# Patient Record
Sex: FEMALE | Race: WHITE | NOT HISPANIC OR LATINO | Employment: FULL TIME | URBAN - METROPOLITAN AREA
[De-identification: names, ages, dates, MRNs, and addresses within clinical notes are randomized per-mention and may not be internally consistent; named-entity substitution may affect disease eponyms.]

---

## 2023-09-20 LAB
FOLLITROPIN (IU/L) IN SER/PLAS: 2.6 IU/L
THYROTROPIN (MIU/L) IN SER/PLAS BY DETECTION LIMIT <= 0.05 MIU/L: 1.13 MIU/L (ref 0.44–3.98)

## 2023-10-23 PROBLEM — N93.8 DYSFUNCTIONAL UTERINE BLEEDING: Status: ACTIVE | Noted: 2023-10-23

## 2023-10-23 PROBLEM — J03.90 TONSILLITIS: Status: ACTIVE | Noted: 2023-10-23

## 2023-10-23 PROBLEM — N92.1 MENORRHAGIA WITH IRREGULAR CYCLE: Status: ACTIVE | Noted: 2023-10-23

## 2023-10-23 PROBLEM — R29.898 DECREASED ROM OF NECK: Status: ACTIVE | Noted: 2023-10-23

## 2023-10-23 PROBLEM — N94.6 DYSMENORRHEA: Status: ACTIVE | Noted: 2023-10-23

## 2023-10-23 PROBLEM — J45.901 ASTHMA EXACERBATION (HHS-HCC): Status: ACTIVE | Noted: 2023-10-23

## 2023-10-23 PROBLEM — R29.898 WEAKNESS OF EXTREMITY: Status: ACTIVE | Noted: 2023-10-23

## 2023-10-23 PROBLEM — M54.12 CERVICAL RADICULOPATHY, CHRONIC: Status: ACTIVE | Noted: 2023-10-23

## 2023-10-23 RX ORDER — CELECOXIB 200 MG/1
1 CAPSULE ORAL 2 TIMES DAILY
COMMUNITY

## 2023-10-23 RX ORDER — MONTELUKAST SODIUM 10 MG/1
1 TABLET ORAL DAILY
COMMUNITY

## 2023-10-23 RX ORDER — LISINOPRIL AND HYDROCHLOROTHIAZIDE 20; 25 MG/1; MG/1
1 TABLET ORAL DAILY
COMMUNITY

## 2023-10-23 RX ORDER — ALBUTEROL SULFATE 0.83 MG/ML
2.5 SOLUTION RESPIRATORY (INHALATION) 3 TIMES DAILY PRN
COMMUNITY

## 2023-10-23 RX ORDER — HYOSCYAMINE SULFATE 0.125 MG
1 TABLET ORAL
COMMUNITY

## 2023-10-23 RX ORDER — ALBUTEROL SULFATE 90 UG/1
2 AEROSOL, METERED RESPIRATORY (INHALATION) 4 TIMES DAILY PRN
COMMUNITY
Start: 2021-07-27

## 2023-10-23 RX ORDER — BUDESONIDE AND FORMOTEROL FUMARATE DIHYDRATE 160; 4.5 UG/1; UG/1
2 AEROSOL RESPIRATORY (INHALATION) 2 TIMES DAILY
COMMUNITY

## 2023-10-24 NOTE — ASSESSMENT & PLAN NOTE
Heavier menses are noted over time. TSH was normal and FSH indicated no menopause 9/20/2023. Ultrasound 9/22/2023 measures uterus 8.3 x 4.5 x 5.6 cm without fibroids and with homogeneous 5 mm endometrium. Right ovary was not visualized. Left ovary contained a 3.7 cm simple cyst.

## 2023-10-24 NOTE — PROGRESS NOTES
Subjective   Patient ID: Eulalia Oleary is a 48 y.o. female who presents for No chief complaint on file..  She was seen for annual exam on 9/20/2023. At that time she states she skipped menses in April and has had heavier menses overall. Lab testing returned with normal TSH and FSH did not indicate menopause. Ultrasound was performed on 9/22/2023. The uterus measured 8.3 x 4.5 x 5.6 cm and was without any fibroids or abnormality noted. Endometrium measured thin at 5 mm. The right ovary was not visualized and there was no evidence of adnexal cyst or mass. The left ovary contained a simple 3.7 cm cyst. She had been told in the past that she had fibroids and endometriosis.     She does report that her mother had abnormal cells within the uterus and had a hysterectomy. Her maternal aunt had ovarian cancer. We did discuss potential genetic testing at her last visit. Pap and HPV were negative on 1/19/2022.   At last visit we had discussed potential for endometrial biopsy given her family history of abnormal uterine cells. We also discussed genetic testing and treatment options for bleeding including progestin releasing IUD and surgery.     She states her last two menses were very light and lasted 5 days with spotting for all but two days. She has decided she would like to observe the bleeding for now with no biopsy or treatment. If bleeding continues to be an issue with cramps, prolonged or heavy menses, she will call. We would then chose to proceed with biopsy and possibly IUD.           Review of Systems   Constitutional:  Negative for activity change.   HENT:  Negative for congestion.    Respiratory:  Negative for apnea and cough.    Cardiovascular:  Negative for chest pain.   Gastrointestinal:  Negative for constipation and diarrhea.   Genitourinary:  Negative for hematuria and vaginal pain.   Musculoskeletal:  Negative for joint swelling.   Neurological:  Negative for dizziness.   Psychiatric/Behavioral:  Negative  for agitation.        Past Medical History:   Diagnosis Date    Personal history of other diseases of the circulatory system     History of hypertension    Personal history of other diseases of the musculoskeletal system and connective tissue     History of chronic back pain    Personal history of other diseases of the musculoskeletal system and connective tissue     History of herniated intervertebral disc    Personal history of other diseases of the respiratory system     History of asthma      Past Surgical History:   Procedure Laterality Date    OTHER SURGICAL HISTORY  12/12/2019    [place] tooth extraction    OTHER SURGICAL HISTORY  12/12/2019    Laparoscopy    OTHER SURGICAL HISTORY  12/12/2019    Back surgery      Allergies   Allergen Reactions    Azithromycin Unknown    Gabapentin Unknown      Current Outpatient Medications on File Prior to Visit   Medication Sig Dispense Refill    albuterol 2.5 mg /3 mL (0.083 %) nebulizer solution Take 3 mL (2.5 mg) by nebulization 3 times a day as needed for wheezing.      albuterol 90 mcg/actuation inhaler Inhale 2 puffs 4 times a day as needed.      budesonide-formoteroL (Symbicort) 160-4.5 mcg/actuation inhaler Inhale 2 puffs 2 times a day.      celecoxib (CeleBREX) 200 mg capsule Take 1 capsule (200 mg) by mouth 2 times a day.      hyoscyamine (Levsin) 0.125 mg tablet Take 1 tablet (0.125 mg) by mouth. 3-4 TIMES DAILY AS NEEDED.      lisinopriL-hydrochlorothiazide 20-25 mg tablet Take 1 tablet by mouth once daily.      montelukast (Singulair) 10 mg tablet Take 1 tablet (10 mg) by mouth once daily.       No current facility-administered medications on file prior to visit.        Objective   Physical Exam  Constitutional:       Appearance: Normal appearance. She is obese.   Neurological:      Mental Status: She is alert and oriented to person, place, and time.   Psychiatric:         Attention and Perception: Attention normal.         Mood and Affect: Mood and affect  normal.         Speech: Speech normal.         Behavior: Behavior normal. Behavior is cooperative.           Assessment/Plan   Problem List Items Addressed This Visit             ICD-10-CM    Dysmenorrhea N94.6    Menorrhagia with irregular cycle - Primary N92.1     Heavier menses are noted over time. TSH was normal and FSH indicated no menopause 9/20/2023. Ultrasound 9/22/2023 measures uterus 8.3 x 4.5 x 5.6 cm without fibroids and with homogeneous 5 mm endometrium. Right ovary was not visualized. Left ovary contained a 3.7 cm simple cyst.

## 2023-10-25 ENCOUNTER — OFFICE VISIT (OUTPATIENT)
Dept: OBSTETRICS AND GYNECOLOGY | Facility: CLINIC | Age: 48
End: 2023-10-25
Payer: COMMERCIAL

## 2023-10-25 VITALS
BODY MASS INDEX: 49 KG/M2 | SYSTOLIC BLOOD PRESSURE: 122 MMHG | WEIGHT: 287 LBS | DIASTOLIC BLOOD PRESSURE: 90 MMHG | HEIGHT: 64 IN

## 2023-10-25 DIAGNOSIS — N94.6 DYSMENORRHEA: ICD-10-CM

## 2023-10-25 DIAGNOSIS — N92.1 MENORRHAGIA WITH IRREGULAR CYCLE: Primary | ICD-10-CM

## 2023-10-25 PROCEDURE — 1036F TOBACCO NON-USER: CPT | Performed by: OBSTETRICS & GYNECOLOGY

## 2023-10-25 PROCEDURE — 99214 OFFICE O/P EST MOD 30 MIN: CPT | Performed by: OBSTETRICS & GYNECOLOGY

## 2023-10-25 ASSESSMENT — ENCOUNTER SYMPTOMS
DIARRHEA: 0
ACTIVITY CHANGE: 0
CONSTIPATION: 0
DIZZINESS: 0
HEMATURIA: 0
AGITATION: 0
JOINT SWELLING: 0
COUGH: 0
APNEA: 0

## 2024-09-10 ENCOUNTER — HOSPITAL ENCOUNTER (OUTPATIENT)
Dept: RADIOLOGY | Facility: HOSPITAL | Age: 49
Discharge: HOME | End: 2024-09-10
Payer: COMMERCIAL

## 2024-09-10 VITALS — WEIGHT: 290 LBS | BODY MASS INDEX: 49.51 KG/M2 | HEIGHT: 64 IN

## 2024-09-10 DIAGNOSIS — Z12.31 ENCOUNTER FOR SCREENING MAMMOGRAM FOR MALIGNANT NEOPLASM OF BREAST: ICD-10-CM

## 2024-09-10 PROCEDURE — 77067 SCR MAMMO BI INCL CAD: CPT

## 2024-09-10 PROCEDURE — 77063 BREAST TOMOSYNTHESIS BI: CPT

## 2024-10-04 PROBLEM — Z01.419 WELL WOMAN EXAM WITH ROUTINE GYNECOLOGICAL EXAM: Status: ACTIVE | Noted: 2024-10-04

## 2024-10-04 NOTE — PROGRESS NOTES
Subjective   Patient ID: Eulalia Oleary is a 49 y.o. female who presents for Annual Exam.  She presents for annual exam.     One year ago she had temporarily heavy menses. TSH returned in normal range and FSH was not elevated. Her family history was reviewed at that time as well. She reported that her mother had abnormal cells within the uterus and had a hysterectomy. Her maternal aunt had ovarian cancer. Last year we discussed potential for endometrial biopsy given her family history of abnormal uterine cells. We also discussed genetic testing and treatment options for bleeding including progestin releasing IUD and surgery.   Pelvic ultrasound 9/22/2023 showed a normal appearing uterus and a simple 3.7 cm cyst in the left adnexa. At last visit she declined endometrial biopsy and treatment since the next few menses were light in amount.      She states she is still having heavy menses. Menses are overall monthly but she skipped April menses. She is bleeding for about 7 days. She has heavy bleeding for 2-3 days. She will fill a pad every 2-4 hours and can pass some clots. She can have cramping during and in between menses. She is noting bloating surrounding her menses as well.    We reviewed the recommendation to proceed with ultrasound and endometrial biopsy at follow up.  She is very interested in hysterectomy for definitive management with removal of ovaries due to family history of ovarian cancer. She declines genetic testing at this time, preferring to pursue this privately using [employer] or other home kit.   We reviewed her surgical risks and challenges related to her increased BMI and nulliparous status.           Review of Systems   Constitutional:  Negative for activity change.   HENT:  Negative for congestion.    Respiratory:  Negative for apnea and cough.    Cardiovascular:  Negative for chest pain.   Gastrointestinal:  Negative for constipation and diarrhea.   Genitourinary:  Negative for hematuria and  vaginal pain.   Musculoskeletal:  Negative for joint swelling.   Neurological:  Negative for dizziness.   Psychiatric/Behavioral:  Negative for agitation.        Past Medical History:   Diagnosis Date    Personal history of other diseases of the circulatory system     History of hypertension    Personal history of other diseases of the musculoskeletal system and connective tissue     History of chronic back pain    Personal history of other diseases of the musculoskeletal system and connective tissue     History of herniated intervertebral disc    Personal history of other diseases of the respiratory system     History of asthma      Past Surgical History:   Procedure Laterality Date    OTHER SURGICAL HISTORY  12/12/2019    [place] tooth extraction    OTHER SURGICAL HISTORY  12/12/2019    Laparoscopy    OTHER SURGICAL HISTORY  12/12/2019    Back surgery      Allergies   Allergen Reactions    Azithromycin Unknown    Gabapentin Unknown      Current Outpatient Medications on File Prior to Visit   Medication Sig Dispense Refill    albuterol 2.5 mg /3 mL (0.083 %) nebulizer solution Take 3 mL (2.5 mg) by nebulization 3 times a day as needed for wheezing.      albuterol 90 mcg/actuation inhaler Inhale 2 puffs 4 times a day as needed.      budesonide-formoteroL (Symbicort) 160-4.5 mcg/actuation inhaler Inhale 2 puffs 2 times a day.      celecoxib (CeleBREX) 200 mg capsule Take 1 capsule (200 mg) by mouth 2 times a day.      hyoscyamine (Levsin) 0.125 mg tablet Take 1 tablet (0.125 mg) by mouth. 3-4 TIMES DAILY AS NEEDED.      lisinopriL-hydrochlorothiazide 20-25 mg tablet Take 1 tablet by mouth once daily.      montelukast (Singulair) 10 mg tablet Take 1 tablet (10 mg) by mouth once daily.       No current facility-administered medications on file prior to visit.        Objective   Physical Exam  Constitutional:       Appearance: Normal appearance. She is obese.   Neck:      Thyroid: No thyromegaly.   Cardiovascular:       Rate and Rhythm: Normal rate and regular rhythm.      Heart sounds: Normal heart sounds.   Pulmonary:      Effort: Pulmonary effort is normal.      Breath sounds: Normal breath sounds.   Chest:      Chest wall: No mass.   Breasts:     Right: Normal. No inverted nipple, mass, nipple discharge or skin change.      Left: Normal. No inverted nipple, mass, nipple discharge or skin change.   Abdominal:      General: There is no distension.      Palpations: Abdomen is soft. There is no mass.      Tenderness: There is no abdominal tenderness.   Genitourinary:     General: Normal vulva.      Exam position: Lithotomy position.      Labia:         Right: No rash.         Left: No rash.       Urethra: No urethral lesion.      Vagina: Normal. No lesions.      Cervix: No friability or lesion.      Uterus: Normal. Not enlarged and not tender.       Adnexa: Right adnexa normal and left adnexa normal.        Right: No mass or tenderness.          Left: No mass or tenderness.     Musculoskeletal:         General: No deformity.      Cervical back: Neck supple.   Lymphadenopathy:      Cervical: No cervical adenopathy.   Skin:     General: Skin is warm and dry.      Findings: No rash.   Neurological:      General: No focal deficit present.      Mental Status: She is alert.   Psychiatric:         Mood and Affect: Mood normal.         Behavior: Behavior is cooperative.         Thought Content: Thought content normal.           Problem List Items Addressed This Visit       Well woman exam with routine gynecological exam - Primary    Overview     1/9/2022 pap and HPV returned negative.          Current Assessment & Plan     Pap is not yet indicated.  Mammogram is due in September 2025.   Regular exercise and attaining/maintaining a healthy weight is encouraged.   Adequate calcium intake with diet or supplements is encouraged.    We will notify of any abnormal results.          Menorrhagia with irregular cycle    Overview     Heavier menses  are noted over time. TSH was normal and FSH indicated no menopause 9/20/2023. Ultrasound 9/22/2023 measures uterus 8.3 x 4.5 x 5.6 cm without fibroids and with homogeneous 5 mm endometrium. Right ovary was not visualized. Left ovary contained a 3.7 cm simple cyst.  Endometrial biopsy was declined in 2023.         Current Assessment & Plan     Follow up ultrasound is ordered for continued menses.  Endometrial biopsy is recommended and she is willing to perform this at follow up visit after ultrasound.  She is interested in possible surgical management of heavy menses. Will place referral to advanced gynecologic surgeon for surgery planning given anticipated challenges and increased risks due to BMI, abdominal wall anatomy and nulliparous status.          Relevant Orders    US PELVIS TRANSABDOMINAL WITH TRANSVAGINAL    Referral to Gynecology    Dysmenorrhea    Relevant Orders    US PELVIS TRANSABDOMINAL WITH TRANSVAGINAL    Referral to Gynecology     Other Visit Diagnoses       BMI 50.0-59.9, adult (Multi)        Relevant Orders    Referral to Gynecology

## 2024-10-04 NOTE — ASSESSMENT & PLAN NOTE
Pap is not yet indicated.  Mammogram is due in September 2025.   Regular exercise and attaining/maintaining a healthy weight is encouraged.   Adequate calcium intake with diet or supplements is encouraged.    We will notify of any abnormal results.

## 2024-10-08 ENCOUNTER — APPOINTMENT (OUTPATIENT)
Dept: OBSTETRICS AND GYNECOLOGY | Facility: CLINIC | Age: 49
End: 2024-10-08
Payer: COMMERCIAL

## 2024-10-08 VITALS
DIASTOLIC BLOOD PRESSURE: 84 MMHG | BODY MASS INDEX: 50.02 KG/M2 | WEIGHT: 293 LBS | HEIGHT: 64 IN | SYSTOLIC BLOOD PRESSURE: 150 MMHG

## 2024-10-08 DIAGNOSIS — N92.1 MENORRHAGIA WITH IRREGULAR CYCLE: ICD-10-CM

## 2024-10-08 DIAGNOSIS — N94.6 DYSMENORRHEA: ICD-10-CM

## 2024-10-08 DIAGNOSIS — Z01.419 WELL WOMAN EXAM WITH ROUTINE GYNECOLOGICAL EXAM: Primary | ICD-10-CM

## 2024-10-08 PROCEDURE — 99396 PREV VISIT EST AGE 40-64: CPT | Performed by: OBSTETRICS & GYNECOLOGY

## 2024-10-08 PROCEDURE — 1036F TOBACCO NON-USER: CPT | Performed by: OBSTETRICS & GYNECOLOGY

## 2024-10-08 PROCEDURE — 99213 OFFICE O/P EST LOW 20 MIN: CPT | Performed by: OBSTETRICS & GYNECOLOGY

## 2024-10-08 PROCEDURE — 3008F BODY MASS INDEX DOCD: CPT | Performed by: OBSTETRICS & GYNECOLOGY

## 2024-10-08 ASSESSMENT — ENCOUNTER SYMPTOMS
CONSTIPATION: 0
HEMATURIA: 0
APNEA: 0
JOINT SWELLING: 0
DIARRHEA: 0
COUGH: 0
ACTIVITY CHANGE: 0
AGITATION: 0
DIZZINESS: 0

## 2024-10-08 NOTE — ASSESSMENT & PLAN NOTE
Follow up ultrasound is ordered for continued menses.  Endometrial biopsy is recommended and she is willing to perform this at follow up visit after ultrasound.  She is interested in possible surgical management of heavy menses. Will place referral to advanced gynecologic surgeon for surgery planning given anticipated challenges and increased risks due to BMI, abdominal wall anatomy and nulliparous status.

## 2024-10-21 ENCOUNTER — HOSPITAL ENCOUNTER (OUTPATIENT)
Dept: RADIOLOGY | Facility: HOSPITAL | Age: 49
Discharge: HOME | End: 2024-10-21
Payer: COMMERCIAL

## 2024-10-21 DIAGNOSIS — N94.6 DYSMENORRHEA: ICD-10-CM

## 2024-10-21 DIAGNOSIS — N92.1 MENORRHAGIA WITH IRREGULAR CYCLE: ICD-10-CM

## 2024-10-21 PROCEDURE — 76856 US EXAM PELVIC COMPLETE: CPT

## 2024-10-21 PROCEDURE — 76856 US EXAM PELVIC COMPLETE: CPT | Performed by: STUDENT IN AN ORGANIZED HEALTH CARE EDUCATION/TRAINING PROGRAM

## 2024-10-21 PROCEDURE — 76830 TRANSVAGINAL US NON-OB: CPT | Performed by: STUDENT IN AN ORGANIZED HEALTH CARE EDUCATION/TRAINING PROGRAM

## 2024-10-23 PROBLEM — R93.89 THICKENED ENDOMETRIUM: Status: ACTIVE | Noted: 2024-10-23

## 2024-10-23 NOTE — PROGRESS NOTES
Patient ID: Eulalia Oleary is a 49 y.o. female.    Endometrial biopsy    Date/Time: 11/7/2024 9:59 AM    Performed by: Aisha Lao MD  Authorized by: Aisha Lao MD    Consent:     Consent obtained: written    Consent given by: patient    Risks discussed: bleeding, infection and pain    Alternatives discussed: observation  Indications:     Indications: abnormal uterine bleeding    Procedure:     A bimanual exam was performed: yes      Uterus size: 6-8 weeks    Uterus position: midposition    Prepped with: Betadine    Tenaculum used: yes      A local block was performed: no      Local anesthetic: none    Cervix dilated: no      Number of passes: 1  Findings:     Cervix: normal      Specimen collected: low volume sample collected      Patient tolerance: tolerated with difficulty  Subjective   Patient ID: Eulalia Oleary is a 49 y.o. female who presents for Biopsy (EMB for heavy periods.).  10/8/2024 documentation:  She presents for annual exam.     One year ago she had temporarily heavy menses. TSH returned in normal range and FSH was not elevated. Her family history was reviewed at that time as well. She reported that her mother had abnormal cells within the uterus and had a hysterectomy. Her maternal aunt had ovarian cancer. Last year we discussed potential for endometrial biopsy given her family history of abnormal uterine cells. We also discussed genetic testing and treatment options for bleeding including progestin releasing IUD and surgery.   Pelvic ultrasound 9/22/2023 showed a normal appearing uterus and a simple 3.7 cm cyst in the left adnexa. At last visit she declined endometrial biopsy and treatment since the next few menses were light in amount.      She states she is still having heavy menses. Menses are overall monthly but she skipped April menses. She is bleeding for about 7 days. She has heavy bleeding for 2-3 days. She will fill a pad every 2-4 hours and can pass some clots. She  can have cramping during and in between menses. She is noting bloating surrounding her menses as well.    We reviewed the recommendation to proceed with ultrasound and endometrial biopsy at follow up.  She is very interested in hysterectomy for definitive management with removal of ovaries due to family history of ovarian cancer. She declines genetic testing at this time, preferring to pursue this privately using [email or web address] or other home kit.   We reviewed her surgical risks and challenges related to her increased BMI and nulliparous status.     11/7/2024:  Pelvic ultrasound was performed on 10/21/2024:  UTERUS:  The uterus measures  5.9 cm x 3.9 cm x 7.6 cm. Normal myometrium.  ENDOMETRIUM:  Endometrial thickness 1 cm  RIGHT ADNEXA:  Not visualized  LEFT ADNEXA:  The left ovary measures 2.0 cm x 1.9 cm x 3.2 cm and demonstrates normal flow. No gross left adnexal masses are seen, no hydrosalpinx.    Endometrial biopsy is planned today. She is scheduled with Dr. Rodriguez for surgery planning. She also now reveals that she was diagnosed with endometriosis at laparoscopy and was treated with Lupron in her mid 20's.            Review of Systems   Constitutional:  Negative for activity change.   HENT:  Negative for congestion.    Respiratory:  Negative for apnea and cough.    Cardiovascular:  Negative for chest pain.   Gastrointestinal:  Negative for constipation and diarrhea.   Genitourinary:  Negative for hematuria and vaginal pain.   Musculoskeletal:  Negative for joint swelling.   Neurological:  Negative for dizziness.   Psychiatric/Behavioral:  Negative for agitation.        Past Medical History:   Diagnosis Date    Personal history of other diseases of the circulatory system     History of hypertension    Personal history of other diseases of the musculoskeletal system and connective tissue     History of chronic back pain    Personal history of other diseases of the musculoskeletal system and connective tissue      History of herniated intervertebral disc    Personal history of other diseases of the respiratory system     History of asthma      Past Surgical History:   Procedure Laterality Date    OTHER SURGICAL HISTORY  12/12/2019    [place] tooth extraction    OTHER SURGICAL HISTORY  12/12/2019    Laparoscopy    OTHER SURGICAL HISTORY  12/12/2019    Back surgery      Allergies   Allergen Reactions    Azithromycin Unknown    Gabapentin Unknown      Current Outpatient Medications on File Prior to Visit   Medication Sig Dispense Refill    albuterol 2.5 mg /3 mL (0.083 %) nebulizer solution Take 3 mL (2.5 mg) by nebulization 3 times a day as needed for wheezing.      albuterol 90 mcg/actuation inhaler Inhale 2 puffs 4 times a day as needed.      budesonide-formoteroL (Symbicort) 160-4.5 mcg/actuation inhaler Inhale 2 puffs 2 times a day.      celecoxib (CeleBREX) 200 mg capsule Take 1 capsule (200 mg) by mouth 2 times a day.      cholecalciferol (Vitamin D-3) 25 MCG (1000 UT) tablet once every 24 hours.      hyoscyamine (Levsin) 0.125 mg tablet Take 1 tablet (0.125 mg) by mouth. 3-4 TIMES DAILY AS NEEDED.      lansoprazole (Prevacid 24Hr) 15 mg DR capsule Take 1 capsule (15 mg) by mouth once daily.      lisinopriL-hydrochlorothiazide 20-25 mg tablet Take 1 tablet by mouth once daily.      magnesium oxide 500 mg magnesium tablet Take 250 mg by mouth once daily.      montelukast (Singulair) 10 mg tablet Take 1 tablet (10 mg) by mouth once daily.       No current facility-administered medications on file prior to visit.        Objective   Physical Exam  Constitutional:       Appearance: Normal appearance. She is obese.   Abdominal:      Palpations: Abdomen is soft.   Genitourinary:     General: Normal vulva.      Exam position: Lithotomy position.      Labia:         Right: No lesion.         Left: No lesion.       Urethra: No urethral lesion.      Vagina: Normal. No lesions.      Cervix: No friability or lesion.      Uterus:  Normal. Not enlarged and not tender.       Adnexa: Right adnexa normal and left adnexa normal.        Right: No mass or tenderness.          Left: No mass or tenderness.     Skin:     General: Skin is warm and dry.   Neurological:      Mental Status: She is alert.           Problem List Items Addressed This Visit       Thickened endometrium    Overview     10/21/2024 ultrasound shows normal appearing uterus with 1 cm endometrium.   Endometrial biopsy is planned.         Menorrhagia with irregular cycle - Primary    Overview     Heavier menses are noted over time. TSH was normal and FSH indicated no menopause 9/20/2023. Ultrasound 9/22/2023 measures uterus 8.3 x 4.5 x 5.6 cm without fibroids and with homogeneous 5 mm endometrium. Right ovary was not visualized. Left ovary contained a 3.7 cm simple cyst.  Endometrial biopsy was declined in 2023.  10/21/2024 usn shows normal sized uterus with 1 cm endometrial thickness. Endometrial biopsy is performed 11/7/2024.         Current Assessment & Plan     She desires definitive treatment with hysterectomy. She is scheduled in January 2025 with Dr. Rodriguez for surgery planning.          Dysmenorrhea    Overview     Severe cramping with menses.

## 2024-11-07 ENCOUNTER — APPOINTMENT (OUTPATIENT)
Dept: OBSTETRICS AND GYNECOLOGY | Facility: CLINIC | Age: 49
End: 2024-11-07
Payer: COMMERCIAL

## 2024-11-07 VITALS — SYSTOLIC BLOOD PRESSURE: 126 MMHG | DIASTOLIC BLOOD PRESSURE: 88 MMHG | HEIGHT: 64 IN | BODY MASS INDEX: 53.38 KG/M2

## 2024-11-07 DIAGNOSIS — N94.6 DYSMENORRHEA: ICD-10-CM

## 2024-11-07 DIAGNOSIS — N92.1 MENORRHAGIA WITH IRREGULAR CYCLE: Primary | ICD-10-CM

## 2024-11-07 DIAGNOSIS — R93.89 THICKENED ENDOMETRIUM: ICD-10-CM

## 2024-11-07 PROCEDURE — 58100 BIOPSY OF UTERUS LINING: CPT | Performed by: OBSTETRICS & GYNECOLOGY

## 2024-11-07 PROCEDURE — 99213 OFFICE O/P EST LOW 20 MIN: CPT | Performed by: OBSTETRICS & GYNECOLOGY

## 2024-11-07 RX ORDER — CHOLECALCIFEROL (VITAMIN D3) 25 MCG
TABLET ORAL EVERY 24 HOURS
COMMUNITY

## 2024-11-07 RX ORDER — BACLOFEN 20 MG
250 TABLET ORAL
COMMUNITY

## 2024-11-07 RX ORDER — CALC/MAG/B COMPLEX/D3/HERB 61
1 TABLET ORAL DAILY
COMMUNITY

## 2024-11-07 ASSESSMENT — ENCOUNTER SYMPTOMS
HEMATURIA: 0
JOINT SWELLING: 0
AGITATION: 0
APNEA: 0
COUGH: 0
DIARRHEA: 0
ACTIVITY CHANGE: 0
DIZZINESS: 0
CONSTIPATION: 0

## 2024-11-07 NOTE — ASSESSMENT & PLAN NOTE
She desires definitive treatment with hysterectomy. She is scheduled in January 2025 with Dr. Rodriguez for surgery planning.

## 2024-11-22 LAB
LAB AP ASR DISCLAIMER: NORMAL
LABORATORY COMMENT REPORT: NORMAL
PATH REPORT.COMMENTS IMP SPEC: NORMAL
PATH REPORT.FINAL DX SPEC: NORMAL
PATH REPORT.GROSS SPEC: NORMAL
PATH REPORT.RELEVANT HX SPEC: NORMAL
PATH REPORT.TOTAL CANCER: NORMAL

## 2024-11-29 ENCOUNTER — EVALUATION (OUTPATIENT)
Dept: PHYSICAL THERAPY | Facility: HOSPITAL | Age: 49
End: 2024-11-29
Payer: COMMERCIAL

## 2024-11-29 DIAGNOSIS — M54.12 RADICULOPATHY, CERVICAL REGION: ICD-10-CM

## 2024-11-29 DIAGNOSIS — M53.82 IMPAIRED RANGE OF MOTION OF CERVICAL SPINE: Primary | ICD-10-CM

## 2024-11-29 PROCEDURE — 97161 PT EVAL LOW COMPLEX 20 MIN: CPT | Mod: GP | Performed by: PHYSICAL THERAPIST

## 2024-11-29 PROCEDURE — 97110 THERAPEUTIC EXERCISES: CPT | Mod: GP | Performed by: PHYSICAL THERAPIST

## 2024-11-29 ASSESSMENT — ENCOUNTER SYMPTOMS
DEPRESSION: 0
LOSS OF SENSATION IN FEET: 0
OCCASIONAL FEELINGS OF UNSTEADINESS: 1

## 2024-11-29 ASSESSMENT — PATIENT HEALTH QUESTIONNAIRE - PHQ9
1. LITTLE INTEREST OR PLEASURE IN DOING THINGS: NOT AT ALL
2. FEELING DOWN, DEPRESSED OR HOPELESS: NOT AT ALL
SUM OF ALL RESPONSES TO PHQ9 QUESTIONS 1 AND 2: 0

## 2024-11-29 ASSESSMENT — PAIN - FUNCTIONAL ASSESSMENT: PAIN_FUNCTIONAL_ASSESSMENT: 0-10

## 2024-11-29 ASSESSMENT — PAIN SCALES - GENERAL: PAINLEVEL_OUTOF10: 5 - MODERATE PAIN

## 2024-11-29 ASSESSMENT — PAIN DESCRIPTION - DESCRIPTORS: DESCRIPTORS: SHARP

## 2024-11-29 NOTE — PROGRESS NOTES
"Physical Therapy    Physical Therapy Evaluation and Treatment      Patient Name: Eulalia Oleary  MRN: [id number]  : 1975   Today's Date: 2024  Time Calculation  Start Time: 805  Stop Time: 845  Time Calculation (min): 40 min     PT Evaluation Time Entry  PT Evaluation (Low) Time Entry: 30  PT Therapeutic Procedures Time Entry  Therapeutic Exercise Time Entry: 10          Visit # 1    Assessment: Pt presents with impaired cervical AROM and right UE strength, impaired posture and pain limiting function. Pt would benefit from skilled PT to address these limitations for improved function and quality of life.    PT Assessment Results: Decreased strength, Decreased range of motion, Pain  Rehab Prognosis: Good    Plan:  Treatment/Interventions: Cryotherapy, Dry needling, Education/ Instruction, Manual therapy, Mechanical traction, Therapeutic exercises  PT Plan: Skilled PT  PT Frequency: 2 times per week  Duration: 4-6 wks  Certification Period Start Date: 24  Certification Period End Date: 25  Number of Treatments Authorized: [facility]  Rehab Potential: Good  Plan of Care Agreement: Patient    Current Problem:   1. Impaired range of motion of cervical spine  Follow Up In Physical Therapy      2. Radiculopathy, cervical region  Referral to Physical Therapy    Follow Up In Physical Therapy          Subjective      Chief complaint: Pt c/o pain around her right shoulder blade and travels down her arm, pressure in hand of medical nerve distribution, some mid-neck pain. Thinks it is maybe a little better. Right UE feels \"stiff\".    Pain Better: ice, pain patches    Pain Worse: using mouse at work, certain chairs    Imaging: None    Prior level of function: use of cane due to left LE weakness from HX fo lumbar issues/surgery; lefty-handed    Current limitations: work duties, sleep    Work:      Patient's goal: get rid of pain    Precautions:  Precautions  STEADI Fall Risk Score (The score of 4 " or more indicates an increased risk of falling): 5  Precautions Comment: None    Pain:  Pain Assessment  Pain Assessment: 0-10  0-10 (Numeric) Pain Score: 5 - Moderate pain (7/10 max)  Pain Location: Neck  Pain Orientation: Right  Pain Radiating Towards: right scapula and UE  Pain Descriptors: Sharp  Pain Frequency: Constant/continuous    Objective:  Objective   Posture: Fwd head/shoulder posture, walks with can and Trendelenburg gait    Cervical ROM  FLEX: WNL with pain  EXT: 22 deg with relief  ROT: 60 deg L, 38 deg L  SB: 25 deg varun    R UE Strength: (L WNL)  Shoulder FLEX: 4/5  Shoulder ABD: 4-/5  Elbow FLEX: 4/5  Elbow EXT: 4-/5  Wrist EXT: 4+/5    Scapula Stabilizer Strength:  NT    Cervical compression: (-)  Cervical decompression: (-)  Spurling's: (+) R  Close-packed R: (+)    Deep neck flexor test: NT    Tender to palpation right upper trap, periscapular musculature     Outcome Measures:  Other Measures  Neck Disability Index: 19     Treatments:  EXERCISES Date  Date  Date Date   VISIT# # # # #    REPS REPS REPS REPS          Pulleys                            T-band:       Pull downs       Mid rows       Varun ER                                   Corner pec stretch                            Mechanical cervical traction                                                               HEP: Access Code: [email or web address]  URL: [email or web address]  Date: 11/29/2024  Prepared by: Kemal Mena    Exercises  - Seated Cervical Retraction and Extension  - 2 x daily - 1 sets - 10 reps - 3 seconds hold  - Seated Scapular Retraction  - 2 x daily - 1 sets - 10 reps - 3 seconds hold          Goals:  Active       Right cervical radiculopathy       Improve cervical AROM to WNL all directions for safe driving.         Start:  11/29/24    Expected End:  02/27/25            Improve varun UE strength to 5/5 for improved work duties.        Start:  11/29/24    Expected End:  02/27/25            Pt will demonstrate  improved sitting posture to decrease stress on neck and back.        Start:  11/29/24    Expected End:  02/27/25            <=1/10 neck pain for improved daily activities.        Start:  11/29/24    Expected End:  02/27/25            Improve NDI by 8 points for improved mobility.        Start:  11/29/24    Expected End:  02/27/25

## 2024-12-13 ENCOUNTER — TREATMENT (OUTPATIENT)
Dept: PHYSICAL THERAPY | Facility: HOSPITAL | Age: 49
End: 2024-12-13
Payer: COMMERCIAL

## 2024-12-13 DIAGNOSIS — M54.12 RADICULOPATHY, CERVICAL REGION: ICD-10-CM

## 2024-12-13 DIAGNOSIS — M53.82 IMPAIRED RANGE OF MOTION OF CERVICAL SPINE: ICD-10-CM

## 2024-12-13 ASSESSMENT — PAIN - FUNCTIONAL ASSESSMENT: PAIN_FUNCTIONAL_ASSESSMENT: 0-10

## 2024-12-13 ASSESSMENT — PAIN SCALES - GENERAL: PAINLEVEL_OUTOF10: 2

## 2024-12-13 NOTE — PROGRESS NOTES
Physical Therapy    Physical Therapy Treatment    Patient Name: Eulalia Oleary  MRN: [id number]  : 1975   Today's Date: 2024  Time Calculation  Start Time: 1420  Stop Time: 1458  Time Calculation (min): 38 min     PT Therapeutic Procedures Time Entry  Therapeutic Exercise Time Entry: 28  PT Modalities Time Entry  Mechanical Traction Time Entry: 10              Visit Number: 2    Current Problem  Problem List Items Addressed This Visit             ICD-10-CM    Radiculopathy, cervical region M54.12    Impaired range of motion of cervical spine M53.82        Subjective   Pt reports compliance with HEP and no recent falls states she feels she is already getting better.   Precautions  Precautions  Precautions Comment: None    Pain  Pain Assessment: 0-10  0-10 (Numeric) Pain Score: 2  Pain Location: Shoulder  Pain Orientation: Right      Objective     Treatments:  EXERCISES Date 2024 Date  Date Date   VISIT# #2 # # #    REPS REPS REPS REPS          Pulleys 3 min                           T-band:       Pull downs Red x10      Mid rows Red x10      Varun ER                                   Corner pec stretch [id number] (low)                           Mechanical cervical traction 12#/8# 30sec/ 10sec 10min                                                              HEP: Access Code: [email or web address]  URL: [email or web address]  Date: 2024  Prepared by: Kemal Mena    Exercises  - Seated Cervical Retraction and Extension  - 2 x daily - 1 sets - 10 reps - 3 seconds hold  - Seated Scapular Retraction  - 2 x daily - 1 sets - 10 reps - 3 seconds hold Reviewed           Assessment:  Pt tolerated all exercises well with minimal difficulty reporting increased low back pain with supine lying. Pt states she does not notice a change in neck and shoulder pain at end of session. Pt demonstrates good understanding of HEP without questions this date.     Plan:  Monitor pt response to session and  progress as tolerated for improved functional abilities with decreased pain.   OP PT Plan  Treatment/Interventions: Cryotherapy, Dry needling, Education/ Instruction, Manual therapy, Mechanical traction, Therapeutic exercises  PT Plan: Skilled PT  PT Frequency: 2 times per week  Duration: 4-6 wks  Certification Period Start Date: 11/18/24  Certification Period End Date: 11/18/25  Number of Treatments Authorized: [facility]  Rehab Potential: Good  Plan of Care Agreement: Patient    Goals:  Active       Right cervical radiculopathy       Improve cervical AROM to WNL all directions for safe driving.         Start:  11/29/24    Expected End:  02/27/25            Improve jennie UE strength to 5/5 for improved work duties.        Start:  11/29/24    Expected End:  02/27/25            Pt will demonstrate improved sitting posture to decrease stress on neck and back.        Start:  11/29/24    Expected End:  02/27/25            <=1/10 neck pain for improved daily activities.        Start:  11/29/24    Expected End:  02/27/25            Improve NDI by 8 points for improved mobility.        Start:  11/29/24    Expected End:  02/27/25

## 2024-12-16 ENCOUNTER — TREATMENT (OUTPATIENT)
Dept: PHYSICAL THERAPY | Facility: HOSPITAL | Age: 49
End: 2024-12-16
Payer: COMMERCIAL

## 2024-12-16 DIAGNOSIS — M53.82 IMPAIRED RANGE OF MOTION OF CERVICAL SPINE: ICD-10-CM

## 2024-12-16 DIAGNOSIS — M54.12 RADICULOPATHY, CERVICAL REGION: ICD-10-CM

## 2024-12-16 PROCEDURE — 97110 THERAPEUTIC EXERCISES: CPT | Mod: GP,CQ

## 2024-12-16 ASSESSMENT — PAIN SCALES - GENERAL: PAINLEVEL_OUTOF10: 3

## 2024-12-16 ASSESSMENT — PAIN - FUNCTIONAL ASSESSMENT: PAIN_FUNCTIONAL_ASSESSMENT: 0-10

## 2024-12-16 NOTE — PROGRESS NOTES
Physical Therapy    Physical Therapy Treatment    Patient Name: Eulalia Oleary  MRN: [id number]  : 1975   Today's Date: 2024  Time Calculation  Start Time: 945  Stop Time:   Time Calculation (min): 30 min     PT Therapeutic Procedures Time Entry  Therapeutic Exercise Time Entry: 30                 Visit Number: 3    Current Problem  Problem List Items Addressed This Visit             ICD-10-CM    Radiculopathy, cervical region M54.12    Impaired range of motion of cervical spine M53.82        Subjective   Pt reports no neck pain, R shoulder pain radiating down into R hand causing tingling in R index and middle finger. Pt states she does not want to do cervical traction today.   Precautions  Precautions  Precautions Comment: None    Pain  Pain Assessment: 0-10  0-10 (Numeric) Pain Score: 3  Pain Location: Shoulder  Pain Orientation: Right  Pain Radiating Towards: R hand      Objective   Pt arrived to session late   Pt reports increased R hand tingling with pulleys this date, subsided with rest   Pt reports increased R hand tingling with cervical snags and rotation to R, subsided with rest   Treatments:  EXERCISES Date 2024 Date 2024 Date Date   VISIT# #2 #3  # #    REPS REPS REPS REPS          Pulleys 3 min 3 min     UE Bike                     T-band:       Pull downs Red x10 Red 2x10     Mid rows Red x10 Red 2x10     Varun ER  Red 2x10                                 Corner pec stretch 5r03pti (low) [id number]             Towel in L hand snag with cervical rotation to R   5sec x5     Towel snag with cervical ext  5sec x5     Mechanical cervical traction 12#/8# 30sec/ 10sec 10min NT            Radial nerve glide       Median nerve glide               HEP: Access Code: [email or web address]  URL: [email or web address]  Date: 2024  Prepared by: Kemal Mena    Exercises  - Seated Cervical Retraction and Extension  - 2 x daily - 1 sets - 10 reps - 3 seconds hold  - Seated  Scapular Retraction  - 2 x daily - 1 sets - 10 reps - 3 seconds hold Reviewed           Assessment:  Pt tolerated all exercises well with minimal difficulty reporting R UE fatigue and muscle soreness at end of session with R index and middle finger tingling. Pt reports no change in R shoulder pain.     Plan:  Continue to improve cervical and upper back strength for improved posture.   OP PT Plan  Treatment/Interventions: Cryotherapy, Dry needling, Education/ Instruction, Manual therapy, Mechanical traction, Therapeutic exercises  PT Plan: Skilled PT  PT Frequency: 2 times per week  Duration: 4-6 wks  Certification Period Start Date: 11/18/24  Certification Period End Date: 11/18/25  Number of Treatments Authorized: [facility]  Rehab Potential: Good  Plan of Care Agreement: Patient    Goals:  Active       Right cervical radiculopathy       Improve cervical AROM to WNL all directions for safe driving.         Start:  11/29/24    Expected End:  02/27/25            Improve jennie UE strength to 5/5 for improved work duties.        Start:  11/29/24    Expected End:  02/27/25            Pt will demonstrate improved sitting posture to decrease stress on neck and back.        Start:  11/29/24    Expected End:  02/27/25            <=1/10 neck pain for improved daily activities.        Start:  11/29/24    Expected End:  02/27/25            Improve NDI by 8 points for improved mobility.        Start:  11/29/24    Expected End:  02/27/25

## 2024-12-20 ENCOUNTER — TREATMENT (OUTPATIENT)
Dept: PHYSICAL THERAPY | Facility: HOSPITAL | Age: 49
End: 2024-12-20
Payer: COMMERCIAL

## 2024-12-20 DIAGNOSIS — M54.12 RADICULOPATHY, CERVICAL REGION: ICD-10-CM

## 2024-12-20 DIAGNOSIS — M53.82 IMPAIRED RANGE OF MOTION OF CERVICAL SPINE: ICD-10-CM

## 2024-12-20 PROCEDURE — 97140 MANUAL THERAPY 1/> REGIONS: CPT | Mod: GP,CQ

## 2024-12-20 PROCEDURE — 97110 THERAPEUTIC EXERCISES: CPT | Mod: GP,CQ

## 2024-12-20 ASSESSMENT — PAIN SCALES - GENERAL: PAINLEVEL_OUTOF10: 3

## 2024-12-20 ASSESSMENT — PAIN - FUNCTIONAL ASSESSMENT: PAIN_FUNCTIONAL_ASSESSMENT: 0-10

## 2024-12-20 NOTE — PROGRESS NOTES
Physical Therapy    Physical Therapy Treatment    Patient Name: Eulalia Oleary  MRN: [id number]  : 1975   Today's Date: 2024  Time Calculation  Start Time: 1500  Stop Time: 1539  Time Calculation (min): 39 min     PT Therapeutic Procedures Time Entry  Manual Therapy Time Entry: 8  Therapeutic Exercise Time Entry: 31                 Visit Number: 4    Current Problem  Problem List Items Addressed This Visit             ICD-10-CM    Radiculopathy, cervical region M54.12    Impaired range of motion of cervical spine M53.82        Subjective   Pt states she feels her pain is around her R shoulder blades. Reports compliance with HEP.   Precautions  Precautions  Precautions Comment: None    Pain  Pain Assessment: 0-10  0-10 (Numeric) Pain Score: 3  Pain Location: Shoulder  Pain Orientation: Right  Pain Radiating Towards: R hand      Objective   R UT, thoracic paraspinals, levator hypertonicity.   Treatments:  EXERCISES Date 2024 Date 2024 Date 2024 Date   VISIT# #2 #3  #4 #    REPS REPS REPS REPS          Pulleys 3 min 3 min 3 min    UE Bike                     T-band:       Pull downs Red x10 Red 2x10 Red 2x10 seated    Mid rows Red x10 Red 2x10 Red 2x10 seated    Varun ER  Red 2x10 Red 2x10 seated    Horz Abd        R IR        R ER              Corner pec stretch 0x28kqv (low) 5e93rmg (low) 6s90nnf (low)    UT stretch R    6m85tdb     Levator stretch R    6z68roa    Towel in L hand snag with cervical rotation to R   5sec x5 5sec x10    Towel snag with cervical ext  5sec x5 5sec x10    Mechanical cervical traction 12#/8# 30sec/ 10sec 10min NT            Radial nerve glide        Median nerve glide R   X5 (slow)    Manual    STM R UT, thoracic paraspinals, levator 8min    HEP: Access Code: [email or web address]  URL: [email or web address]  Date: 2024  Prepared by: Kemal Mena    Exercises  - Seated Cervical Retraction and Extension  - 2 x daily - 1 sets - 10 reps - 3 seconds  hold  - Seated Scapular Retraction  - 2 x daily - 1 sets - 10 reps - 3 seconds hold Reviewed           Assessment:  Pt tolerated all exercises well with minimal difficulty reporting R UE fatigue with decreased muscle tightness at end of session.     Plan:  Continue to improve upper back and cervical strength as tolerated to improve functional abilities with decreased pain.   OP PT Plan  Treatment/Interventions: Cryotherapy, Dry needling, Education/ Instruction, Manual therapy, Mechanical traction, Therapeutic exercises  PT Plan: Skilled PT  PT Frequency: 2 times per week  Duration: 4-6 wks  Certification Period Start Date: 11/18/24  Certification Period End Date: 11/18/25  Number of Treatments Authorized: [facility]  Rehab Potential: Good  Plan of Care Agreement: Patient    Goals:  Active       Right cervical radiculopathy       Improve cervical AROM to WNL all directions for safe driving.         Start:  11/29/24    Expected End:  02/27/25            Improve jennie UE strength to 5/5 for improved work duties.        Start:  11/29/24    Expected End:  02/27/25            Pt will demonstrate improved sitting posture to decrease stress on neck and back.        Start:  11/29/24    Expected End:  02/27/25            <=1/10 neck pain for improved daily activities.        Start:  11/29/24    Expected End:  02/27/25            Improve NDI by 8 points for improved mobility.        Start:  11/29/24    Expected End:  02/27/25

## 2024-12-23 ENCOUNTER — TREATMENT (OUTPATIENT)
Dept: PHYSICAL THERAPY | Facility: HOSPITAL | Age: 49
End: 2024-12-23
Payer: COMMERCIAL

## 2024-12-23 DIAGNOSIS — M54.12 RADICULOPATHY, CERVICAL REGION: ICD-10-CM

## 2024-12-23 DIAGNOSIS — M53.82 IMPAIRED RANGE OF MOTION OF CERVICAL SPINE: ICD-10-CM

## 2024-12-23 PROCEDURE — 97110 THERAPEUTIC EXERCISES: CPT | Mod: GP | Performed by: PHYSICAL THERAPIST

## 2024-12-23 PROCEDURE — 97140 MANUAL THERAPY 1/> REGIONS: CPT | Mod: GP | Performed by: PHYSICAL THERAPIST

## 2024-12-23 ASSESSMENT — PAIN SCALES - GENERAL: PAINLEVEL_OUTOF10: 2

## 2024-12-23 ASSESSMENT — PAIN - FUNCTIONAL ASSESSMENT: PAIN_FUNCTIONAL_ASSESSMENT: 0-10

## 2024-12-23 NOTE — PROGRESS NOTES
Physical Therapy    Physical Therapy Treatment    Patient Name: Eulalia Oleary  MRN: [id number]  : 1975   Today's Date: 2024  Time Calculation  Start Time: 945  Stop Time:   Time Calculation (min): 40 min       PT Therapeutic Procedures Time Entry  Manual Therapy Time Entry: 10  Therapeutic Exercise Time Entry: 30      Visit #5    Assessment: Decreasing UE symptoms with pt tolerating exercises well with minor c/o scapular discomfort with cervical SNAG's.  PT Assessment  PT Assessment Results: Decreased strength, Decreased range of motion, Pain  Rehab Prognosis: Good    Pt reports 2/10 pain after treatment.    Plan: Reassess next visit, issue HEP of cervical SNAG's and stretching. She states she has tried to band exercises at home in the past but there's no good place to do them.   OP PT Plan  Treatment/Interventions: Cryotherapy, Dry needling, Education/ Instruction, Manual therapy, Mechanical traction, Therapeutic exercises  PT Plan: Skilled PT  PT Frequency: 2 times per week  Duration: 4-6 wks  Certification Period Start Date: 24  Certification Period End Date: 25  Number of Treatments Authorized: [facility]  Rehab Potential: Good  Plan of Care Agreement: Patient    Current Problem  1. Radiculopathy, cervical region  Follow Up In Physical Therapy      2. Impaired range of motion of cervical spine  Follow Up In Physical Therapy          Subjective   General  Pt states she is doing her HEP. Felt sore after soft tissue mobilization and then back to baseline. Feels her UE symptoms have improved greatly.    Precautions  Precautions  Precautions Comment: None    Pain  Pain Assessment: 0-10  0-10 (Numeric) Pain Score: 2  Pain Location: Shoulder  Pain Orientation: Right  Pain Radiating Towards: r hand    Objective       Treatments:  EXERCISES Date 2024 Date 2024 Date 2024 Date   VISIT# #2 #3  #4 #    REPS REPS REPS REPS          Pulleys 3 min 3 min 3 min 3'   UE Bike            "          T-band:       Pull downs Red x10 Red 2x10 Red 2x10 seated Red 2x10   Mid rows Red x10 Red 2x10 Red 2x10 seated Red 2x10   Varun ER  Red 2x10 Red 2x10 seated Red 2x10   Horz Abd        R IR        R ER              Corner pec stretch 6z11upy (low) 2i09oma (low) 8g27bbg (low) 3x30\" (low)   UT stretch R    5o91mht     Levator stretch R    5w41cwh    Towel in L hand snag with cervical rotation to R   5sec x5 5sec x10 10x   Towel snag with cervical ext  5sec x5 5sec x10 10x   Mechanical cervical traction 12#/8# 30sec/ 10sec 10min NT            Radial nerve glide        Median nerve glide R   X5 (slow) 5x   Manual    STM R UT, thoracic paraspinals, levator 8min Trigger point release right rhomboids/mid-scapula - 10'   HEP: Access Code: [email or web address]  URL: [email or web address]  Date: 11/29/2024  Prepared by: Kemal Mena    Exercises  - Seated Cervical Retraction and Extension  - 2 x daily - 1 sets - 10 reps - 3 seconds hold  - Seated Scapular Retraction  - 2 x daily - 1 sets - 10 reps - 3 seconds hold    NEW 12/26/24:  Access Code: [email or web address]  Exercises  - Seated Assisted Cervical Rotation with Towel  - 2 x daily - 1-2 sets - 10 reps  - Mid-Lower Cervical Extension SNAG with Strap  - 2 x daily - 1-2 sets - 10 reps  - Doorway Pec Stretch at 60 Elevation  - 2 x daily - 3 sets - 30 seconds hold  - Shoulder External Rotation and Scapular Retraction with Resistance  - 2 x daily - 2-3 sets - 10 reps  - Standing Shoulder Horizontal Abduction with Resistance  - 2 x daily - 2-3 sets - 10 reps Reviewed         Goals:  Active       Right cervical radiculopathy       Improve cervical AROM to WNL all directions for safe driving.         Start:  11/29/24    Expected End:  02/27/25            Improve varun UE strength to 5/5 for improved work duties.        Start:  11/29/24    Expected End:  02/27/25            Pt will demonstrate improved sitting posture to decrease stress on neck and back.        Start:  11/29/24 "    Expected End:  02/27/25            <=1/10 neck pain for improved daily activities.        Start:  11/29/24    Expected End:  02/27/25            Improve NDI by 8 points for improved mobility.        Start:  11/29/24    Expected End:  02/27/25

## 2024-12-26 ENCOUNTER — TREATMENT (OUTPATIENT)
Dept: PHYSICAL THERAPY | Facility: HOSPITAL | Age: 49
End: 2024-12-26
Payer: COMMERCIAL

## 2024-12-26 DIAGNOSIS — M54.12 RADICULOPATHY, CERVICAL REGION: ICD-10-CM

## 2024-12-26 DIAGNOSIS — M53.82 IMPAIRED RANGE OF MOTION OF CERVICAL SPINE: Primary | ICD-10-CM

## 2024-12-26 PROCEDURE — 97110 THERAPEUTIC EXERCISES: CPT | Mod: GP | Performed by: PHYSICAL THERAPIST

## 2024-12-26 ASSESSMENT — PAIN SCALES - GENERAL: PAINLEVEL_OUTOF10: 2

## 2024-12-26 ASSESSMENT — PAIN - FUNCTIONAL ASSESSMENT: PAIN_FUNCTIONAL_ASSESSMENT: 0-10

## 2024-12-26 NOTE — PROGRESS NOTES
Physical Therapy    Physical Therapy Treatment / Discharge    Patient Name: Eulalia Oleary  MRN: [id number]  : 1975   Today's Date: 2024  Time Calculation  Start Time: 905  Stop Time: 930  Time Calculation (min): 25 min       PT Therapeutic Procedures Time Entry  Therapeutic Exercise Time Entry: 25       Visit #6    Assessment:   Pt with improved cervical ROM, UE strength and functional outcome measure. She may benefit from cervical traction but was unable to tolerate supine or semi-inclined traction in PT due to increased LBP. She was educated on home cervical traction units that may be more tolerable and in a HEP of continued posture reeducation and cervical ROM. She has met max benefit from PT and will continue independently.    Pt reports 2/10 pain after treatment.    Plan:   Discharge to independent HEP.    Current Problem  1. Impaired range of motion of cervical spine  Follow Up In Physical Therapy      2. Radiculopathy, cervical region  Follow Up In Physical Therapy          Subjective   General  Pt states her pain is less, occasional tingling in her neck and a little stiffness in her right UE. Very infrequent right hand paresthesia.     Precautions  Precautions  Precautions Comment: None    Pain  Pain Assessment: 0-10  0-10 (Numeric) Pain Score: 2  Pain Location: Shoulder  Pain Orientation: Right  Pain Radiating Towards: R hand    Objective   Posture: Fwd head/shoulder posture, walks with cane and Trendelenburg gait    Cervical ROM  FLEX: WNL with pain  EXT: 26 deg with relief  ROT: 60 deg L, 48 deg R  SB: 28 deg R, 31 deg L    R UE Strength: (L WNL)  Shoulder FLEX: 4/5  Shoulder ABD: 4/5  Elbow FLEX: 4/5  Elbow EXT: 4-/5  Wrist EXT: 5/5      Outcome Measures:  Other Measures  Neck Disability Index: 5 (Improved from 19)    Treatments:  EXERCISES Date 2024 Date 2024 Date 24    VISIT# #3  #4 #5 #6    REPS REPS REPS           Pulleys 3 min 3 min 3'    UE Bike          "            T-band:       Pull downs Red 2x10 Red 2x10 seated Red 2x10    Mid rows Red 2x10 Red 2x10 seated Red 2x10    Varun ER Red 2x10 Red 2x10 seated Red 2x10    Horz Abd        R IR        R ER              Corner pec stretch 3s16vir (low) 6e40ehf (low) 3x30\" (low)    UT stretch R   [id number]      Levator stretch R   1s55jmf     Towel in L hand snag with cervical rotation to R  5sec x5 5sec x10 10x    Towel snag with cervical ext 5sec x5 5sec x10 10x    Mechanical cervical traction NT             Radial nerve glide        Median nerve glide R  X5 (slow) 5x    Manual   STM R UT, thoracic paraspinals, levator 8min Trigger point release right rhomboids/mid-scapula - 10' Reassess and review/progress HEP - 25 min    HEP: Access Code: [email or web address]  URL: [email or web address]  Date: 11/29/2024  Prepared by: Kemal Mena    Exercises  - Seated Cervical Retraction and Extension  - 2 x daily - 1 sets - 10 reps - 3 seconds hold  - Seated Scapular Retraction  - 2 x daily - 1 sets - 10 reps - 3 seconds hold    NEW 12/26/24:  Access Code: [email or web address]  Exercises  - Seated Assisted Cervical Rotation with Towel  - 2 x daily - 1-2 sets - 10 reps  - Mid-Lower Cervical Extension SNAG with Strap  - 2 x daily - 1-2 sets - 10 reps  - Doorway Pec Stretch at 60 Elevation  - 2 x daily - 3 sets - 30 seconds hold  - Shoulder External Rotation and Scapular Retraction with Resistance  - 2 x daily - 2-3 sets - 10 reps  - Standing Shoulder Horizontal Abduction with Resistance  - 2 x daily - 2-3 sets - 10 reps         Goals:  Active       Right cervical radiculopathy       Improve cervical AROM to WNL all directions for safe driving.   (Met)       Start:  11/29/24    Expected End:  02/27/25    Resolved:  12/26/24         Improve varun UE strength to 5/5 for improved work duties.  (Adequate for Discharge)       Start:  11/29/24    Expected End:  02/27/25    Resolved:  12/26/24         Pt will demonstrate improved sitting posture to " decrease stress on neck and back.  (Adequate for Discharge)       Start:  11/29/24    Expected End:  02/27/25    Resolved:  12/26/24         <=1/10 neck pain for improved daily activities.  (Adequate for Discharge)       Start:  11/29/24    Expected End:  02/27/25    Resolved:  12/26/24         Improve NDI by 8 points for improved mobility.  (Met)       Start:  11/29/24    Expected End:  02/27/25    Resolved:  12/26/24

## 2025-01-02 ENCOUNTER — OFFICE VISIT (OUTPATIENT)
Dept: OBSTETRICS AND GYNECOLOGY | Facility: CLINIC | Age: 50
End: 2025-01-02
Payer: COMMERCIAL

## 2025-01-02 VITALS
HEIGHT: 63 IN | WEIGHT: 293 LBS | DIASTOLIC BLOOD PRESSURE: 80 MMHG | SYSTOLIC BLOOD PRESSURE: 156 MMHG | BODY MASS INDEX: 51.91 KG/M2 | HEART RATE: 96 BPM

## 2025-01-02 DIAGNOSIS — Z80.9 FAMILY HISTORY OF CANCER: ICD-10-CM

## 2025-01-02 DIAGNOSIS — N85.01 ENDOMETRIAL HYPERPLASIA WITHOUT ATYPIA, SIMPLE: Primary | ICD-10-CM

## 2025-01-02 PROCEDURE — 3008F BODY MASS INDEX DOCD: CPT | Performed by: STUDENT IN AN ORGANIZED HEALTH CARE EDUCATION/TRAINING PROGRAM

## 2025-01-02 PROCEDURE — 99215 OFFICE O/P EST HI 40 MIN: CPT | Performed by: STUDENT IN AN ORGANIZED HEALTH CARE EDUCATION/TRAINING PROGRAM

## 2025-01-02 PROCEDURE — 1036F TOBACCO NON-USER: CPT | Performed by: STUDENT IN AN ORGANIZED HEALTH CARE EDUCATION/TRAINING PROGRAM

## 2025-01-02 RX ORDER — METRONIDAZOLE 500 MG/100ML
500 INJECTION, SOLUTION INTRAVENOUS ONCE
OUTPATIENT
Start: 2025-01-02 | End: 2025-01-02

## 2025-01-02 RX ORDER — CELECOXIB 400 MG/1
400 CAPSULE ORAL ONCE
OUTPATIENT
Start: 2025-01-02 | End: 2025-01-02

## 2025-01-02 RX ORDER — HEPARIN SODIUM 5000 [USP'U]/ML
5000 INJECTION, SOLUTION INTRAVENOUS; SUBCUTANEOUS ONCE
OUTPATIENT
Start: 2025-01-02 | End: 2025-01-02

## 2025-01-02 RX ORDER — ACETAMINOPHEN 325 MG/1
975 TABLET ORAL ONCE
OUTPATIENT
Start: 2025-01-02 | End: 2025-01-02

## 2025-01-02 ASSESSMENT — ENCOUNTER SYMPTOMS
EYES NEGATIVE: 0
CARDIOVASCULAR NEGATIVE: 0
ENDOCRINE NEGATIVE: 0
PSYCHIATRIC NEGATIVE: 0
ALLERGIC/IMMUNOLOGIC NEGATIVE: 0
GASTROINTESTINAL NEGATIVE: 0
HEMATOLOGIC/LYMPHATIC NEGATIVE: 0
NEUROLOGICAL NEGATIVE: 0
MUSCULOSKELETAL NEGATIVE: 0
CONSTITUTIONAL NEGATIVE: 0
RESPIRATORY NEGATIVE: 0

## 2025-01-02 ASSESSMENT — PAIN SCALES - GENERAL: PAINLEVEL_OUTOF10: 3

## 2025-01-02 NOTE — PROGRESS NOTES
"Division of Minimally Invasive Gynecologic Surgery  [facility]    Date: 1/2/2025 - Gynecology Consult     HISTORY OF PRESENT ILLNESS:  uElalia Oleary 49 y.o. G0 referred by Dr. Lao for endometrial hyperplasia without atypia. Not currently on progesterone.     Painful, heavy periods w/ clotting and menstrual cramping.     Mother w/ atypical uterine cells and hysterectomy, maternal aunt w/ ovarian cancer. She has never had genetic testing.     Imaging:   - Recent pelvic US 10/2024 showed uterus measuring 6cm x 4cm 8cm. EMS 1cm.   Labs:   - TSH 9/2023 WNL   Screening:   - Last pap 2022 negative/negative, no hx of CIN2-3  - Last mammogram 9/2024 BIRADS 1  - Recent EMB w/ endometrial hyperplasia w/o atypia   PMHx: HTN, asthma, chronic back pain, no hx of DVT/PE   PSHx: wisdom teeth, L5 discectomy/laminectomy, laparoscopy in her 20s for endometriosis     PAST MEDICAL HISTORY:  Past Medical History:   Diagnosis Date    Asthma     Chronic back pain     HTN (hypertension)     Lumbar herniated disc     L5    Morbid obesity (Multi)      PAST SURGICAL HISTORY:  Past Surgical History:   Procedure Laterality Date    LAPAROSCOPY DIAGNOSTIC / BIOPSY / ASPIRATION / LYSIS      in her 20's for suspected endometriosis    LUMBAR DISCECTOMY      x 2    WISDOM TOOTH EXTRACTION       PHYSICAL EXAMINATION:  VITAL SIGNS: /80   Pulse 96   Ht 1.6 m (5' 3\")   Wt 133 kg (293 lb)   LMP 12/12/2024   BMI 51.90 kg/m²      Constitutional:  No acute distress, well-nourished and well-developed  HEENT: EOM grossly intact, MMM, neck supple and with full ROM  Pulm:  Effort normal. No accessory muscle usage.  No respiratory distress.  Neurological:  She is alert and oriented to person place and time.  Skin: Warm, no pallor.  Psychiatric:  She has normal mood and affect.    ASSESSMENT:  Eulalia Oleary 49 y.o. G0 referred by Dr. Lao for endometrial hyperplasia without atypia. Not currently on " progesterone.   - Discussed the implications of finding of endometrial hyperplasia without atypia. Reviewed that this diagnosis carries a roughly 10% risk of advancing to endometrial cancer. We reviewed that sometimes these lesions self-resolve and that progesterone therapy can help with resolution. We also discussed that in patients who are not interested in fertility, hysterectomy is an option as this removes the risk of advancement to cancer as well as the risk of more advanced undiagnosed lesions within the uterus.   - She is interested in proceeding with hysterectomy. I do think this is reasonable given risk factors (morbid obesity, nulliparity, possible genetic component unknown). In addition to risk reduction, she also desires definite management of AUB and hysterectomy will address this.   - We discussed the option today of ovarian retention vs oophorectomy. We reviewed that as she is still pre-menopausal, oophorectomy/surgical menopause may increase her risk of cardiovascular incident or early major bone fracture. We discussed the overall increased risk of morbidity and early mortality w/ pre-menopausal oophorectomy. We did discuss that at 49, this risk is thought to be somewhat lessened as 50-52 is the average age of menopause. Her sister also presented to the appointment today and reported she went through menopause at age 49. Eulalia does desire oophorectomy given family history. We did discuss that she does not have a known genetic risk factor that would necessitate oophorectomy. She expressed understanding and is open to genetic referral. As she already desires oophorectomy, we do not need to delay surgery for genetic consultation.   - We also discussed the option of progesterone suppression to manage bleeding while awaiting surgery. She would prefer to avoid this if possible.   - We discussed the standard procedure for laparoscopic hysterectomy and bilateral salpingo-oophorectomy. We reviewed the  risks/benefits/alternatives to surgery. She is aware of the risk of bleeding, infection, and damage to nearby structures, including bladder, ureters, bowel, and vasculature. She is agreeable to blood transfusion if recommended. We reviewed the small risk of laparotomy and the fact that fertility following hysterectomy is not an option.     PLAN:  - Total laparoscopic hysterectomy, bilateral salpingo-oophorectomy, any indicated procedure. PAT: Yes. Main only. Given gait instability (uses cane due to unilateral leg weakness following lumbar disc issues) may require overnight admit and PT assessment for consideration of home PT following surgery.   - Consider progesterone suppression if she is interested, discussed today     Sarah Rodriguez MD  Division of Minimally Invasive Gynecologic Surgery  [facility]

## 2025-01-13 ENCOUNTER — TELEPHONE (OUTPATIENT)
Dept: OBSTETRICS AND GYNECOLOGY | Facility: CLINIC | Age: 50
End: 2025-01-13
Payer: COMMERCIAL

## 2025-01-13 NOTE — TELEPHONE ENCOUNTER
Called home number. Was told Eulalia is at work. Given cell number to reach her [phone]. Called cell number. Patient states has not heard from surgery scheduler. Appointment was 1/2/25. Will check with Shanti (surgery scheduler). May contact patient via cell phone. Works M-F 8-4.

## 2025-01-13 NOTE — TELEPHONE ENCOUNTER
Patient called requesting information regarding her surgery that is Griebel is suppose to do. Patient can be reached at [phone]

## 2025-01-18 ENCOUNTER — HOSPITAL ENCOUNTER (OUTPATIENT)
Facility: HOSPITAL | Age: 50
Setting detail: OUTPATIENT SURGERY
End: 2025-01-18
Attending: STUDENT IN AN ORGANIZED HEALTH CARE EDUCATION/TRAINING PROGRAM | Admitting: STUDENT IN AN ORGANIZED HEALTH CARE EDUCATION/TRAINING PROGRAM
Payer: COMMERCIAL

## 2025-01-18 DIAGNOSIS — G89.18 POSTOPERATIVE PAIN: ICD-10-CM

## 2025-01-18 DIAGNOSIS — N85.01 ENDOMETRIAL HYPERPLASIA WITHOUT ATYPIA, SIMPLE: Primary | ICD-10-CM

## 2025-01-23 DIAGNOSIS — Z01.818 PREOP EXAMINATION: Primary | ICD-10-CM

## 2025-04-14 ENCOUNTER — TELEPHONE (OUTPATIENT)
Dept: OBSTETRICS AND GYNECOLOGY | Facility: CLINIC | Age: 50
End: 2025-04-14
Payer: COMMERCIAL

## 2025-04-14 NOTE — TELEPHONE ENCOUNTER
Pt is calling stating she hasn't had a menstrual since February--Does that mean don't need a hysterectomy now? Pls contact pt at this number

## 2025-04-14 NOTE — TELEPHONE ENCOUNTER
Spoke with patient, states she hasn't had cycle since January.  Would like to know if Dr. Rodriguez would still recommend a hysterectomy.  Advised will message Dr. Rodriguez.

## 2025-05-02 ENCOUNTER — TELEMEDICINE (OUTPATIENT)
Dept: OBSTETRICS AND GYNECOLOGY | Facility: CLINIC | Age: 50
End: 2025-05-02
Payer: COMMERCIAL

## 2025-05-02 DIAGNOSIS — N85.02 EIN (ENDOMETRIAL INTRAEPITHELIAL NEOPLASIA): Primary | ICD-10-CM

## 2025-05-02 PROCEDURE — 99213 OFFICE O/P EST LOW 20 MIN: CPT | Performed by: STUDENT IN AN ORGANIZED HEALTH CARE EDUCATION/TRAINING PROGRAM

## 2025-05-02 NOTE — PROGRESS NOTES
Reviewed surveillance + progesterone/ hysteroscopy/biopsy q3-6 months  Prior biopsy was super painful for her, reviewed anesthesia options  Reviewed that hysterectomy may be recommended in the future if progression. In the event of persistence, we discussed that hysterectomy would be a continued option to avoid further need for sampling/progesterone/surveillance.   She inquired about risks of hysterectomy. We reviewed the risk of bleeding, infection, and damage to nearby structures, including bladder, ureters, bowel, and vasculature. She is agreeable to blood transfusion if recommended. We reviewed the possibility of laparotomy, and discussed the risks of post operative infection.   - We also discussed that she is at higher than average risk for uterine cancer given EH w/o atypia in the setting of morbid obesity.   Undecided but scheduled, will let me know      persistence, we discussed that hysterectomy would be a continued option to avoid further need for sampling/progesterone/surveillance.   - We also discussed that she is at higher than average risk for uterine cancer given EH w/o atypia in the setting of morbid obesity.   - She inquired about risks of hysterectomy. We reviewed the risk of bleeding, infection, and damage to nearby structures, including bladder, ureters, bowel, and vasculature. She is agreeable to blood transfusion if recommended. We reviewed the possibility of laparotomy, and discussed the risks of post operative infection.   - She is undecided, but will let me know.       Clinical update: Patient sent [employer] message stating that she would like to proceed w/ TLH-BSO. Proceed as scheduled.     Sarah Rodriguez MD  Division of Minimally Invasive Gynecologic Surgery  [facility]

## 2025-05-30 ENCOUNTER — CLINICAL SUPPORT (OUTPATIENT)
Dept: PREADMISSION TESTING | Facility: HOSPITAL | Age: 50
End: 2025-05-30
Payer: COMMERCIAL

## 2025-05-30 NOTE — CPM/PAT H&P
[facility]/PAT Evaluation       Name: Eulalia Oleary (Eulalia Oleary)  /Age: 1975/50 y.o.     { PAT Visit Type:13817}      Chief Complaint: ***    HPI  Eulalia Oleary is scheduled for Total laparoscopic hysterectomy, bilateral salpingo-oophorectomy, any indicated procedure - Bilateral with Dr. Rodriguez on 25.  Medical History[1]    Surgical History[2]    Patient  reports that she is not currently sexually active.    Family History[3]    Allergies[4]    Prior to Admission medications    Medication Sig Start Date End Date Taking? Authorizing Provider   albuterol 2.5 mg /3 mL (0.083 %) nebulizer solution Take 3 mL (2.5 mg) by nebulization 3 times a day as needed for wheezing.    Historical Provider, MD   albuterol 90 mcg/actuation inhaler Inhale 2 puffs 4 times a day as needed. 21   Historical Provider, MD   budesonide-formoteroL (Symbicort) 160-4.5 mcg/actuation inhaler Inhale 2 puffs 2 times a day.    Historical Provider, MD   celecoxib (CeleBREX) 200 mg capsule Take 1 capsule (200 mg) by mouth 2 times a day.    Historical Provider, MD   cholecalciferol (Vitamin D-3) 25 MCG (1000 UT) tablet once every 24 hours.    Historical Provider, MD   hyoscyamine (Levsin) 0.125 mg tablet Take 1 tablet (0.125 mg) by mouth. 3-4 TIMES DAILY AS NEEDED.    Historical Provider, MD   lansoprazole (Prevacid 24Hr) 15 mg DR capsule Take 1 capsule (15 mg) by mouth once daily.    Historical Provider, MD   lisinopriL-hydrochlorothiazide 20-25 mg tablet Take 1 tablet by mouth once daily.    Historical Provider, MD   magnesium oxide 500 mg magnesium tablet Take 250 mg by mouth once daily.    Historical Provider, MD   montelukast (Singulair) 10 mg tablet Take 1 tablet (10 mg) by mouth once daily.    Historical Provider, MD WASSERMAN ROS     PAT Physical Exam     Airway        Visit Vitals  OB Status Perimenopausal   Smoking Status Never       DASI Risk Score    No data to display       Caprini DVT Assessment    No data to  display       Modified Frailty Index    No data to display       [id number] Stroke Risk Points  Current as of just now        N/A 0 to 9 Points:      Last Change: N/A          The [id number] risk score (Lip EDDIE, et al. 2009. © 2010 [employer]) quantifies the risk of stroke for a patient with atrial fibrillation. For patients without atrial fibrillation or under the age of 18 this score appears as N/A. Higher score values generally indicate higher risk of stroke.        This score is not applicable to this patient. Components are not calculated.          Revised Cardiac Risk Index    No data to display       Apfel Simplified Score    No data to display       Risk Analysis Index Results This Encounter    No data found in the last 10 encounters.       Prodigy: High Risk  Total Score: 0          ARISCAT Score for Postoperative Pulmonary Complications    No data to display       Saenz Perioperative Risk for Myocardial Infarction or Cardiac Arrest (PRIMO)    No data to display       Testing/Diagnostic:   US PELVIS TRANSABDOMINAL WITH TRANSVAGINAL; 10/21/2024   IMPRESSION:  1. No acute pelvic pathology. Right adnexa could not be visualized.  2. Endometrial thickness 1 cm. Advise correlation with LMP    US PELVIS; 9/22/2023   Impression:  1.  Mildly enlarged left ovary containing a round dominant 3.7 cm   hypoechoic simple appearing cyst which is more likely benign. Blood   flow demonstrated to the left ovary.   2. Nonvisualization of the right ovary.   3. No abnormal endometrial thickening.     Patient Specialist/PCP:  OBGYN: Sarah Rodriguez MD 5/2/25 presents in follow up to discuss plan for TLH-BSO for endometrial hyperplasia w/o atypia in the setting of BMI 52 and persistent AUB. Currently scheduled for June. Not currently on progesterone.   Clinical update: Patient sent [employer] message stating that she would like to proceed w/ TLH-BSO. Proceed as scheduled.        OGBYN:  Aisha YADAV  "MD Shilo 10/8/24 presents for Annual Exam.     PCP: Sandar King MD at [facility] 4/7/25 seen for Pain in R side neck and right shoulder, feels \"pinchy\" last 7-8 days; pain level [id number]      Assessment and Plan:    ONLY    Elham Mejias RN  Pre-Admission Testing   {[facility] ASSESSMENT AND PLAN:44382}             [1]   Past Medical History:  Diagnosis Date    Abnormal uterine bleeding (AUB)     Asthma     Chronic back pain     Dysmenorrhea     Endometrial hyperplasia     HTN (hypertension)     Lumbar herniated disc     L5    Menorrhagia with irregular cycle     Morbid obesity (Multi)     BMI 52   [2]   Past Surgical History:  Procedure Laterality Date    COLONOSCOPY      ENDOMETRIAL BIOPSY  11/07/2024    ESOPHAGOGASTRODUODENOSCOPY      LAPAROSCOPY DIAGNOSTIC / BIOPSY / ASPIRATION / LYSIS      in her 20's for suspected endometriosis    LUMBAR DISCECTOMY  06/01/2016    x 2    LUMBAR LAMINECTOMY  03/2009    L5-S1 laminectomy/microdisectomy    WISDOM TOOTH EXTRACTION     [3]   Family History  Problem Relation Name Age of Onset    Other (htn) Mother      Coronary artery disease Father      Diabetes Father      Other (htn) Father      Ovarian cancer Mother's Sister      Colon cancer Other Uncle         Paternal uncle   [4]   Allergies  Allergen Reactions    Azithromycin Unknown    Gabapentin Unknown     "

## 2025-06-06 ENCOUNTER — PRE-ADMISSION TESTING (OUTPATIENT)
Dept: PREADMISSION TESTING | Facility: HOSPITAL | Age: 50
End: 2025-06-06
Payer: COMMERCIAL

## 2025-06-06 VITALS
HEIGHT: 64 IN | TEMPERATURE: 98.2 F | DIASTOLIC BLOOD PRESSURE: 81 MMHG | WEIGHT: 293 LBS | SYSTOLIC BLOOD PRESSURE: 144 MMHG | BODY MASS INDEX: 50.02 KG/M2 | OXYGEN SATURATION: 97 %

## 2025-06-06 DIAGNOSIS — Z01.818 PREOP EXAMINATION: ICD-10-CM

## 2025-06-06 DIAGNOSIS — Z01.818 PREOPERATIVE EXAMINATION: Primary | ICD-10-CM

## 2025-06-06 DIAGNOSIS — N85.01 ENDOMETRIAL HYPERPLASIA WITHOUT ATYPIA, SIMPLE: ICD-10-CM

## 2025-06-06 PROBLEM — K58.0 IRRITABLE BOWEL SYNDROME WITH DIARRHEA: Status: RESOLVED | Noted: 2025-06-06 | Resolved: 2025-06-06

## 2025-06-06 PROBLEM — I89.0 LYMPHEDEMA: Status: RESOLVED | Noted: 2025-06-06 | Resolved: 2025-06-06

## 2025-06-06 PROBLEM — F33.40 MDD (RECURRENT MAJOR DEPRESSIVE DISORDER) IN REMISSION: Status: RESOLVED | Noted: 2025-06-06 | Resolved: 2025-06-06

## 2025-06-06 LAB
ABO GROUP (TYPE) IN BLOOD: NORMAL
ANION GAP SERPL CALC-SCNC: 14 MMOL/L (ref 10–20)
ANTIBODY SCREEN: NORMAL
BASOPHILS # BLD AUTO: 0.05 X10*3/UL (ref 0–0.1)
BASOPHILS NFR BLD AUTO: 0.5 %
BUN SERPL-MCNC: 14 MG/DL (ref 6–23)
CALCIUM SERPL-MCNC: 8.9 MG/DL (ref 8.6–10.6)
CHLORIDE SERPL-SCNC: 101 MMOL/L (ref 98–107)
CO2 SERPL-SCNC: 25 MMOL/L (ref 21–32)
CREAT SERPL-MCNC: 0.64 MG/DL (ref 0.5–1.05)
EGFRCR SERPLBLD CKD-EPI 2021: >90 ML/MIN/1.73M*2
EOSINOPHIL # BLD AUTO: 0.22 X10*3/UL (ref 0–0.7)
EOSINOPHIL NFR BLD AUTO: 2.3 %
ERYTHROCYTE [DISTWIDTH] IN BLOOD BY AUTOMATED COUNT: 14.4 % (ref 11.5–14.5)
GLUCOSE SERPL-MCNC: 98 MG/DL (ref 74–99)
HCT VFR BLD AUTO: 36.6 % (ref 36–46)
HGB BLD-MCNC: 11.6 G/DL (ref 12–16)
IMM GRANULOCYTES # BLD AUTO: 0.08 X10*3/UL (ref 0–0.7)
IMM GRANULOCYTES NFR BLD AUTO: 0.8 % (ref 0–0.9)
LYMPHOCYTES # BLD AUTO: 2.1 X10*3/UL (ref 1.2–4.8)
LYMPHOCYTES NFR BLD AUTO: 22 %
MCH RBC QN AUTO: 28.7 PG (ref 26–34)
MCHC RBC AUTO-ENTMCNC: 31.7 G/DL (ref 32–36)
MCV RBC AUTO: 91 FL (ref 80–100)
MONOCYTES # BLD AUTO: 0.74 X10*3/UL (ref 0.1–1)
MONOCYTES NFR BLD AUTO: 7.7 %
NEUTROPHILS # BLD AUTO: 6.37 X10*3/UL (ref 1.2–7.7)
NEUTROPHILS NFR BLD AUTO: 66.7 %
NRBC BLD-RTO: 0 /100 WBCS (ref 0–0)
PLATELET # BLD AUTO: 299 X10*3/UL (ref 150–450)
POTASSIUM SERPL-SCNC: 4.2 MMOL/L (ref 3.5–5.3)
RBC # BLD AUTO: 4.04 X10*6/UL (ref 4–5.2)
RH FACTOR (ANTIGEN D): NORMAL
SODIUM SERPL-SCNC: 136 MMOL/L (ref 136–145)
WBC # BLD AUTO: 9.6 X10*3/UL (ref 4.4–11.3)

## 2025-06-06 PROCEDURE — 99205 OFFICE O/P NEW HI 60 MIN: CPT | Performed by: FAMILY MEDICINE

## 2025-06-06 PROCEDURE — 86901 BLOOD TYPING SEROLOGIC RH(D): CPT

## 2025-06-06 PROCEDURE — 87081 CULTURE SCREEN ONLY: CPT

## 2025-06-06 PROCEDURE — 80051 ELECTROLYTE PANEL: CPT

## 2025-06-06 PROCEDURE — 85025 COMPLETE CBC W/AUTO DIFF WBC: CPT

## 2025-06-06 PROCEDURE — 93005 ELECTROCARDIOGRAM TRACING: CPT

## 2025-06-06 PROCEDURE — 36415 COLL VENOUS BLD VENIPUNCTURE: CPT

## 2025-06-06 RX ORDER — CHLORHEXIDINE GLUCONATE 40 MG/ML
SOLUTION TOPICAL
Qty: 473 ML | Refills: 0 | Status: SHIPPED | OUTPATIENT
Start: 2025-06-06

## 2025-06-06 RX ORDER — CHLORHEXIDINE GLUCONATE ORAL RINSE 1.2 MG/ML
SOLUTION DENTAL
Qty: 15 ML | Refills: 0 | Status: SHIPPED | OUTPATIENT
Start: 2025-06-06

## 2025-06-06 ASSESSMENT — ENCOUNTER SYMPTOMS
NEUROLOGICAL NEGATIVE: 1
CARDIOVASCULAR NEGATIVE: 1
ARTHRALGIAS: 1
NECK NEGATIVE: 1
ENDOCRINE NEGATIVE: 1
CONSTITUTIONAL NEGATIVE: 1
GASTROINTESTINAL NEGATIVE: 1
RESPIRATORY NEGATIVE: 1
EYES NEGATIVE: 1

## 2025-06-06 ASSESSMENT — DUKE ACTIVITY SCORE INDEX (DASI)
CAN YOU PARTICIPATE IN STRENOUS SPORTS LIKE SWIMMING, SINGLES TENNIS, FOOTBALL, BASKETBALL, OR SKIING: NO
DASI METS SCORE: 6.7
CAN YOU DO HEAVY WORK AROUND THE HOUSE LIKE SCRUBBING FLOORS OR LIFTING AND MOVING HEAVY FURNITURE: YES
CAN YOU TAKE CARE OF YOURSELF (EAT, DRESS, BATHE, OR USE TOILET): YES
TOTAL_SCORE: 32.2
CAN YOU PARTICIPATE IN MODERATE RECREATIONAL ACTIVITIES LIKE GOLF, BOWLING, DANCING, DOUBLES TENNIS OR THROWING A BASEBALL OR FOOTBALL: NO
CAN YOU WALK A BLOCK OR TWO ON LEVEL GROUND: YES
CAN YOU RUN A SHORT DISTANCE: NO
CAN YOU DO LIGHT WORK AROUND THE HOUSE LIKE DUSTING OR WASHING DISHES: YES
CAN YOU DO YARD WORK LIKE RAKING LEAVES, WEEDING OR PUSHING A MOWER: NO
CAN YOU DO MODERATE WORK AROUND THE HOUSE LIKE VACUUMING, SWEEPING FLOORS OR CARRYING GROCERIES: YES
CAN YOU HAVE SEXUAL RELATIONS: YES
CAN YOU CLIMB A FLIGHT OF STAIRS OR WALK UP A HILL: YES
CAN YOU WALK INDOORS, SUCH AS AROUND YOUR HOUSE: YES

## 2025-06-06 ASSESSMENT — LIFESTYLE VARIABLES: SMOKING_STATUS: NONSMOKER

## 2025-06-06 ASSESSMENT — PAIN SCALES - GENERAL: PAINLEVEL_OUTOF10: 0 - NO PAIN

## 2025-06-06 ASSESSMENT — PAIN - FUNCTIONAL ASSESSMENT: PAIN_FUNCTIONAL_ASSESSMENT: 0-10

## 2025-06-06 NOTE — CPM/PAT H&P
CPM/PAT Evaluation       Name: Eulalia Oleary (Eulalia Oleary)  /Age: 1975/50 y.o.       Visit Type:   In-Person       Chief Complaint: perioperative evaluation    HPI    Eulalia Oleary is a 50 y.o. female scheduled for total laparoscopic hysterectomy, bilateral salpingo-oophorectomy.  And any indicated procedures, bilaterally on  secondary to Endometrial hyperplasia without atypia, with Dr. Rodriguez, who referred to [facility].  Presents to [facility] today for perioperative risk stratification and optimization. PMHx includes hypertension, asthma, IBS, lumbar disc disease, endometrial hyperplasia without atypia      Medical History[1]    Surgical History[2]    Patient Sexual activity questions deferred to the physician.    Family History[3]    Allergies[4]    Prior to Admission medications    Medication Sig Start Date End Date Taking? Authorizing Provider   albuterol 2.5 mg /3 mL (0.083 %) nebulizer solution Take 3 mL (2.5 mg) by nebulization 3 times a day as needed for wheezing.    Historical Provider, MD   albuterol 90 mcg/actuation inhaler Inhale 2 puffs 4 times a day as needed. 21   Historical Provider, MD   budesonide-formoteroL (Symbicort) 160-4.5 mcg/actuation inhaler Inhale 2 puffs 2 times a day.    Historical Provider, MD   celecoxib (CeleBREX) 200 mg capsule Take 1 capsule (200 mg) by mouth 2 times a day.    Historical Provider, MD   chlorhexidine (Hibiclens) 4 % external liquid Use as directed daily preoperatively for 5 days leading up to surgery, wash body all over not on face or genital region, let sit on skin for 3 minutes before rising. 25   KAYLEE Awad   chlorhexidine (Peridex) 0.12 % solution Swish and spit with 15ml of solution the night before and morning of surgery. Do not swallow. 25   KAYLEE Awad   cholecalciferol (Vitamin D-3) 25 MCG (1000 UT) tablet once every 24 hours.    Historical Provider, MD   hyoscyamine (Levsin) 0.125 mg tablet  "Take 1 tablet (0.125 mg) by mouth. 3-4 TIMES DAILY AS NEEDED.    Historical Provider, MD   lansoprazole (Prevacid 24Hr) 15 mg DR capsule Take 1 capsule (15 mg) by mouth once daily.    Historical Provider, MD   lisinopriL-hydrochlorothiazide 20-25 mg tablet Take 1 tablet by mouth once daily.    Historical Provider, MD   magnesium oxide 500 mg magnesium tablet Take 250 mg by mouth once daily.    Historical Provider, MD   montelukast (Singulair) 10 mg tablet Take 1 tablet (10 mg) by mouth once daily.    Historical Provider, MD        PAT ROS:   Constitutional:   neg    Neuro/Psych:   neg    Eyes:   neg    Ears:   neg    Nose:   neg    Mouth:   neg    Throat:   neg    Neck:   neg    Cardio:   neg    Respiratory:   neg    Endocrine:   neg    GI:   neg    :   neg    Musculoskeletal:    arthralgias (chonic back pain at baseline \"not a big deal\")  Hematologic:   neg    Skin:  neg        Physical Exam  Vitals reviewed. Physical exam within normal limits.   Constitutional:       Appearance: Normal appearance. She is obese.   HENT:      Head: Normocephalic.      Nose: Nose normal.   Cardiovascular:      Heart sounds: Normal heart sounds.   Pulmonary:      Effort: Pulmonary effort is normal.      Breath sounds: Normal breath sounds. No wheezing.   Musculoskeletal:         General: Normal range of motion.      Cervical back: Normal range of motion and neck supple.   Skin:     General: Skin is warm and dry.   Neurological:      General: No focal deficit present.      Mental Status: She is alert and oriented to person, place, and time. Mental status is at baseline.   Psychiatric:         Mood and Affect: Mood normal.          PAT AIRWAY:   Airway:      \"A little stiff\" in neck flex and ext    Mallampati::  III    TM distance::  >3 FB    Neck ROM::  Limited   crowns  normal         Visit Vitals  /81   Temp 36.8 °C (98.2 °F) (Temporal)   Ht 1.626 m (5' 4\")   Wt 135 kg (296 lb 11.2 oz)   SpO2 97%   BMI 50.93 kg/m²   OB Status " Perimenopausal   Smoking Status Never   BSA 2.47 m²       DASI Risk Score      Flowsheet Row Pre-Admission Testing from 6/6/2025 in [facility]   Can you take care of yourself (eat, dress, bathe, or use toilet)?  2.75 filed at 06/06/2025 1106   Can you walk indoors, such as around your house? 1.75 filed at 06/06/2025 1106   Can you walk a block or two on level ground?  2.75 filed at 06/06/2025 1106   Can you climb a flight of stairs or walk up a hill? 5.5 filed at 06/06/2025 1106   Can you run a short distance? 0 filed at 06/06/2025 1106   Can you do light work around the house like dusting or washing dishes? 2.7 filed at 06/06/2025 1106   Can you do moderate work around the house like vacuuming, sweeping floors or carrying groceries? 3.5 filed at 06/06/2025 1106   Can you do heavy work around the house like scrubbing floors or lifting and moving heavy furniture?  8 filed at 06/06/2025 1106   Can you do yard work like raking leaves, weeding or pushing a mower? 0 filed at 06/06/2025 1106   Can you have sexual relations? 5.25 filed at 06/06/2025 1106   Can you participate in moderate recreational activities like golf, bowling, dancing, doubles tennis or throwing a baseball or football? 0 filed at 06/06/2025 1106   Can you participate in strenous sports like swimming, singles tennis, football, basketball, or skiing? 0 filed at 06/06/2025 1106   DASI SCORE 32.2 filed at 06/06/2025 1106   METS Score (Will be calculated only when all the questions are answered) 6.7 filed at 06/06/2025 1106          Caprini DVT Assessment      Flowsheet Row Pre-Admission Testing from 6/6/2025 in [facility]   DVT Score (IF A SCORE IS NOT CALCULATING, MUST SELECT A BMI TO COMPLETE) 8 filed at 06/06/2025 1129   Medical Factors Swollen legs  [lymphedema and obesity, wears compression hose] filed at 06/06/2025 1129   Surgical Factors Major surgery planned, lasting 2-3 hours filed at 06/06/2025 1129   BMI (BMI  MUST BE CHOSEN) 41-50 (Morbid obesity) filed at 06/06/2025 1129          Modified Frailty Index    No data to display       [id number] Stroke Risk Points         N/A 0 to 9 Points:      Last Change: N/A          The [id number] risk score (Parveen MORGAN, et al. 2009. © 2010 [employer]) quantifies the risk of stroke for a patient with atrial fibrillation. For patients without atrial fibrillation or under the age of 18 this score appears as N/A. Higher score values generally indicate higher risk of stroke.        This score is not applicable to this patient. Components are not calculated.          Revised Cardiac Risk Index      Flowsheet Row Pre-Admission Testing from 6/6/2025 in [facility]   High-Risk Surgery (Intraperitoneal, Intrathoracic,Suprainguinal vascular) 1 filed at 06/06/2025 1138   History of ischemic heart disease (History of MI, History of positive exercuse test, Current chest paint considered due to myocardial ischemia, Use of nitrate therapy, ECG with pathological Q Waves) 0 filed at 06/06/2025 1138   History of congestive heart failure (pulmonary edemia, bilateral rales or S3 gallop, Paroxysmal nocturnal dyspnea, CXR showing pulmonary vascular redistribution) 0 filed at 06/06/2025 1138   History of cerebrovascular disease (Prior TIA or stroke) 0 filed at 06/06/2025 1138   Pre-operative insulin treatment 0 filed at 06/06/2025 1138   Pre-operative creatinine>2 mg/dl 0 filed at 06/06/2025 1138   Revised Cardiac Risk Calculator 1 filed at 06/06/2025 1138          Apfel Simplified Score      Flowsheet Row Pre-Admission Testing from 6/6/2025 in [facility]   Smoking status 1 filed at 06/06/2025 1138   History of motion sickness or PONV  0 filed at 06/06/2025 1138   Use of postoperative opioids 1 filed at 06/06/2025 1138   Gender - Female 1=Yes filed at 06/06/2025 1138   Apfel Simplified Score Calculator 3 filed at 06/06/2025 1138          Risk Analysis  Index Results This Encounter    No data found in the last 10 encounters.       Stop Bang Score      Flowsheet Row Pre-Admission Testing from 6/6/2025 in [facility]   Do you snore loudly? 0 filed at 06/06/2025 1105   Do you often feel tired or fatigued after your sleep? 0 filed at 06/06/2025 1105   Has anyone ever observed you stop breathing in your sleep? 0 filed at 06/06/2025 1105   Do you have or are you being treated for high blood pressure? 1 filed at 06/06/2025 1105   Recent BMI (Calculated) 51.9 filed at 06/06/2025 1105   Is BMI greater than 35 kg/m2? 1=Yes filed at 06/06/2025 1105   Age older than 50 years old? 1=Yes filed at 06/06/2025 1105   Is your neck circumference greater than 17 inches (Male) or 16 inches (Female)? 0 filed at 06/06/2025 1105   Gender - Male 0=No filed at 06/06/2025 1105   STOP-BANG Total Score 3 filed at 06/06/2025 1105          Prodigy: High Risk  Total Score: 0          ARISCAT Score for Postoperative Pulmonary Complications      Flowsheet Row Pre-Admission Testing from 6/6/2025 in [facility]   Age Calculated Score 3 filed at 06/06/2025 1139   Preoperative SpO2 0 filed at 06/06/2025 1139   Respiratory infection in the last month Either upper or lower (i.e., URI, bronchitis, pneumonia), with fever and antibiotic treatment 0 filed at 06/06/2025 1139   Preoperative anemia (Hgb less than 10 g/dl) 0 filed at 06/06/2025 1139   Surgical incision  0 filed at 06/06/2025 1139   Duration of surgery  16 filed at 06/06/2025 1139   Emergency Procedure  0 filed at 06/06/2025 1139   ARISCAT Total Score  19 filed at 06/06/2025 1139          Saenz Perioperative Risk for Myocardial Infarction or Cardiac Arrest (PRIMO)      Flowsheet Row Pre-Admission Testing from 6/6/2025 in [facility]   Calculated Age Score 1.04 filed at 06/06/2025 1139   Functional Status  0 filed at 06/06/2025 1139   ASA Class  -3.29 filed at 06/06/2025 1139   Creatinine 0 filed at  "06/06/2025 1139   Type of Procedure  0.76 filed at 06/06/2025 1139   PRIMO Total Score  -6.74 filed at 06/06/2025 1139   PRIMO % 0.12 filed at 06/06/2025 1139          Assessment and Plan:    Anesthesia/airway:  No anesthesia complications    Neuro:  #Depression-follows with PCP no further perioperative intervention     Alert and oriented x 3.  No cognitive decline.     The patient is at increased risk for perioperative delirium secondary to  age, depression, Patient instructed on and provided cognitive exercises  --Brain exercise packet given to patient including word find, cross word, etc puzzles to be completed over the week leading up to the surgery.    Patient is at increased risk for perioperative CVA secondary to  cardiac disease, HTN    HEENT  No HEENT diagnosis or significant findings on chart review or clinical presentation and evaluation. No further preoperative testing/intervention indicated at this time.    Patient denies needing corrective lenses.  Denies dentures, missing teeth, broken teeth.  Denies hearing loss.    Cardiovascular  #Hypertension - medicated with lisinopril-hydrochlorothiazide and follows with PCP. Pt denies cardiovascular symptoms.  Patient denies exercise intolerance.  Hypertension is well-controlled. Physical exam is benign. METS is >4  and scheduled for non-cardiac surgery.  No additional preoperative testing is currently indicated.    Vital signs today are:  /81   Temp 36.8 °C (98.2 °F) (Temporal)   Ht 1.626 m (5' 4\")   Wt 135 kg (296 lb 11.2 oz)   SpO2 97%   BMI 50.93 kg/m²     METS: 6.7  RCRI: 1 point, 6.0% 30 day risk of MACE (risk for cardiac death, nonfatal myocardial infarction, and nonfactal cardiac arrest)  PRIMO: 0.12% risk for 30-day postoperative MACE  EKG -June 6, 2025  Normal sinus rhythm  Minimal voltage criteria for LVH, may be normal variant  Borderline ECG  Pending cardiology review        Pulmonary  #Asthma -reports is well-controlled.  Medicated with " albuterol, Singulair, Symbicort. Last used albuterol in November/December when she had a cold.  Denies any exacerbations or hospitalizations in the past year.  Physical exam is benign, denies respiratory symptoms.  No further perioperative intervention.     Preoperative deep breathing educational handout provided to patient.    Patient is at increased risk of perioperative complications secondary to  obesity, major surgery, duration of surgery > 2 hours, types of anesthetic    Stop Bang 3 point(s) which is a intermediate risk for moderate to severe GUERDA    --Referral to sleep medicine sent - Does not need to complete prior to procedure    ARISCAT: <26 points, 1.6% risk of in-hospital postoperative pulmonary complication         PRODIGY: 3 points which is a Low risk for opioid-induced respiratory depression          Pumonary toilet education discussed, patient also provided deep breathing exercises and incentive spirometry educational handout      Renal  No Renal diagnosis or significant findings on chart review or clinical presentation and evaluation. No further preoperative testing or intervention is indicated at this time.    No renal diagnosis, however patient is at increase risk for perioperative renal complications secondary to  BMI equal to or greater than 30, HTN, intraperitoneal surgery, use of an ace, arb, or NSAID    Endocrine  No endocrine diagnosis or significant findings on chart review or clinical presentation and evaluation. No further testing or intervention is indicated at this time.      Pt denies steroids in last 6 months        Hematologic  No hematologic diagnosis or significant findings on chart review or clinical presentation and evaluation. No further testing or intervention is indicated at this time.    Patient confirms that they will accept blood should they need it.     Caprini Score 8, patient at High risk for perioperative DVT.                      Patient provided VTE  education/handout.      Gastrointestinal  #IBS-diarrhea-patient reports well-controlled IBS.  Medicated with Levsin and Prevacid.  Patient is able to lie flat, no heartburn, no globus feeling in throat.  Denies GI/ symptoms.       Eat-10 score 0: self-perceived oropharyngeal dysphagia scale (0-40)     Apfel 3 points 61% risk for post operative N/V    Infectious disease  No infectious diagnosis or significant findings on chart review or clinical presentation and evaluation.     Prescription provided for CHG body wash and dental rinse. CHG use instructions reviewed and provided to patient.  Staph screen collected    Patient denies use of antibiotics in the past 3 months.    Musculoskeletal  # Lumbar disc disease- history of laminectomy.  Patient has history of arthritis.  In multiple joints.  Instructed to avoid NSAIDs 7 days prior to surgery.  Expresses understanding.  No further workup needed.    Orders  Labs & Imaging ordered and review:  CBC, BMP, MRSA, T&S, EKG    Laboratory results  Recent Results (from the past 16 weeks)   Basic Metabolic Panel    Collection Time: 06/06/25 11:12 AM   Result Value Ref Range    Glucose 98 74 - 99 mg/dL    Sodium 136 136 - 145 mmol/L    Potassium 4.2 3.5 - 5.3 mmol/L    Chloride 101 98 - 107 mmol/L    Bicarbonate 25 21 - 32 mmol/L    Anion Gap 14 10 - 20 mmol/L    Urea Nitrogen 14 6 - 23 mg/dL    Creatinine 0.64 0.50 - 1.05 mg/dL    eGFR >90 >60 mL/min/1.73m*2    Calcium 8.9 8.6 - 10.6 mg/dL   Type And Screen Is this order related to pregnancy or an upcoming surgery? Yes; Where will this surgery/delivery be performed? [facility]; What is the date of the surgery? 6/16/2025; Has this patient ever had a transfusion? Unknown; ...    Collection Time: 06/06/25 11:12 AM   Result Value Ref Range    ABO TYPE AB     Rh TYPE POS     ANTIBODY SCREEN NEG    CBC and Auto Differential    Collection Time: 06/06/25 11:12 AM   Result Value Ref Range    WBC 9.6 4.4 - 11.3 x10*3/uL     nRBC 0.0 0.0 - 0.0 /100 WBCs    RBC 4.04 4.00 - 5.20 x10*6/uL    Hemoglobin 11.6 (L) 12.0 - 16.0 g/dL    Hematocrit 36.6 36.0 - 46.0 %    MCV 91 80 - 100 fL    MCH 28.7 26.0 - 34.0 pg    MCHC 31.7 (L) 32.0 - 36.0 g/dL    RDW 14.4 11.5 - 14.5 %    Platelets 299 150 - 450 x10*3/uL    Neutrophils % 66.7 40.0 - 80.0 %    Immature Granulocytes %, Automated 0.8 0.0 - 0.9 %    Lymphocytes % 22.0 13.0 - 44.0 %    Monocytes % 7.7 2.0 - 10.0 %    Eosinophils % 2.3 0.0 - 6.0 %    Basophils % 0.5 0.0 - 2.0 %    Neutrophils Absolute 6.37 1.20 - 7.70 x10*3/uL    Immature Granulocytes Absolute, Automated 0.08 0.00 - 0.70 x10*3/uL    Lymphocytes Absolute 2.10 1.20 - 4.80 x10*3/uL    Monocytes Absolute 0.74 0.10 - 1.00 x10*3/uL    Eosinophils Absolute 0.22 0.00 - 0.70 x10*3/uL    Basophils Absolute 0.05 0.00 - 0.10 x10*3/uL      Nares pending  Other  Hold all vitamins and supplements 7 days prior to surgery  Tylenol okay to continue, please hold Aleve/naproxen/ibuprofen/Excedrin/Motrin/Mobic (NSAIDs) for 7 days prior to surgery  No lotion/moisturizers or Deoderant after last shower prior to surgery    Medication, NPO, and all other CPM instructions were reviewed with the patient.  All patient questions were addressed.    Daniela Worthington, APRN-CNP         [1]   Past Medical History:  Diagnosis Date    Abnormal uterine bleeding (AUB)     Asthma     Benign essential hypertension 04/28/2003    Chronic back pain     Dysmenorrhea     Endometrial hyperplasia     HTN (hypertension)     Irritable bowel syndrome with diarrhea 06/06/2025    Lumbar herniated disc     L5    Lymphedema 06/06/2025    MDD (recurrent major depressive disorder) in remission 06/06/2025    Menorrhagia with irregular cycle     Morbid obesity (Multi)     BMI 52    Nephrolithiasis     one time in past, not since    Seasonal allergic rhinitis due to pollen 11/12/2015    Skin disorder     occational dermatitis   [2]   Past Surgical History:  Procedure Laterality Date     COLONOSCOPY      ENDOMETRIAL BIOPSY  11/07/2024    ESOPHAGOGASTRODUODENOSCOPY      LAPAROSCOPY DIAGNOSTIC / BIOPSY / ASPIRATION / LYSIS      in her 20's for suspected endometriosis    LUMBAR DISCECTOMY  06/01/2016    x 2    LUMBAR LAMINECTOMY  03/2009    L5-S1 laminectomy/microdisectomy    WISDOM TOOTH EXTRACTION     [3]   Family History  Problem Relation Name Age of Onset    Other (htn) Mother      Atrial fibrillation Mother      Coronary artery disease Father      Diabetes Father      Other (htn) Father      Stroke Father      Other (vascular Dementia) Father      Hypertension Sister      Ovarian cancer Mother's Sister      Colon cancer Other Uncle         Paternal uncle   [4]   Allergies  Allergen Reactions    Azithromycin Unknown    Gabapentin Other     Pt states it makes her tearful

## 2025-06-06 NOTE — NURSING NOTE
Patient seen in PAT. Orders reviewed with PAT Provider.     Following labs obtained by documenting RN:   T/S, MRSA SWAB, CBC W DIFF,     EKG: COMPLETED TODAY    Patient discharged with: Pre-procedure instructions/AVS, Incentive spirometer, and ERAS Kit.        Marlena Fields BSN, RN  [facility]   [facility]

## 2025-06-06 NOTE — PREPROCEDURE INSTRUCTIONS
Thank you for visiting [facility] ([facility]) today for your pre-procedure evaluation, you were seen by     KAYLEE Awad  Department of Anesthesiology and Perioperative Medicine  Main phone [phone]  Fax [phone]    This summary includes instructions and information to aid you during your perioperative period.  Please read carefully. If you have any questions about your visit today, please call the number listed above.  If you become ill or have any changes to your health before your surgery, please contact your primary care provider and alert your surgeon.    General Medications Instructions (see back for further medication instructions)  Hold all vitamins and supplements 7 days prior to surgery  Tylenol okay to continue, please HOLD Aleve/naproxen/ibuprofen (NSAIDs) for 7 days prior to surgery  No lotion/moisturizers or Deoderant after last shower prior to surgery    You will be called business day prior to surgery to confirm arrival time.     Preparing for Surgery       Preoperative Fasting Guidelines  Why must I stop eating and drinking near surgery time?  With sedation, food or liquid in your stomach can enter your lungs causing serious complications  Food can increase nausea and vomiting  When do I need to stop eating and drinking before my surgery?  Do not eat any food after midnight the night before your surgery/procedure.  You may have up to 13.5 ounces of clear liquid until TWO hours before your instructed arrival time to the hospital.  This includes water, black tea/coffee, (no milk or cream) apple juice, and electrolyte drinks (Gatorade)  You may chew gum until TWO hours before your surgery/procedure   Do not eat any food after midnight the night before your surgery/procedure. You may have up to 13.5 ounces of clear liquid until TWO hours before your instructed arrival time to the hospital.  This includes water, black tea/coffee, (no milk or cream)  apple juice, and electrolyte drinks (Gatorade). You may chew gum until TWO hours before your surgery/procedure       Tobacco and Alcohol;  Do not drink alcohol or smoke within 24 hours of surgery.  It is best to quit smoking for as long as possible before any surgery or procedure.    Quitting Smoking; Recognizing Dangerous Situations:  1-Alcohol use during the first month after quitting, 2-Being around smoke or someone who smokes 3-Times situation routinely smoked  4-Triggers-car, breaks, coffee, when awakening, social events  Coping Skills: 1-Learning new ways to manage stress 2-Exercising 3-Relaxation breathing   4-Change routines 5-Distraction techniques    Websites:  Smoke-Free - offers free text messages and an crow to help you quit. Info includes eating and mood issues that may come with quitting. There is a Live Helpline to talk to an expert. Go to smokefree.gov; Become an Ex-Smoker - the free EX Plan is based on scientific research and useful advice from ex-smokers. It isn't just about quitting smoking.  It's about re-learning life without cigarettes using a 3-step program.  Go to becomeanex.org   Centers for Disease Control offer many suggestions for helping you quit. Includes a Quit Guide and real-life stories. There are sections for specific groups such as LGBT, , different ethnic groups, and pregnant women.  Go to cdc.gov/tobacco/campaign/tips    Other Resources:  [place] Tobacco Quit Line - call 1-800-QUIT-NOW or 1-999.383.2351.  [facility] 2-1-1 - to find local programs and resources. Call 211 or go to 211.org.  [facility] Tobacco Cessation Program - call 863-522-6431.  American Lung Association offers classes for quitting smoking. Some places may charge a fee. For a list of classes, go to lung.org or call 1-842-LUNG-[place].     Some things to think about:; The health benefits of quitting smoking can help most of the major parts of your body. There is no safe amount of cigarette smoke.  Quitting smoking can add years to your life. When you quit, you'll also protect your loved ones from dangerous secondhand smoke. Make a plan, join a support group, and talk to your physician to assist in quitting smoking.                                                                                                              , Quitting Alcohol; Things to think about: Alcohol use disorder is a health condition that can improve with treatment. Each person is unique. A treatment that is good for one person may not be a good fit for someone else. Simply knowing the different options can be an important first step. Treatment can be inpatient, where you stay at a facility, or outpatient, where you stay at home. Your insurance plan may cover some treatment costs.   Resources:    NIAAA Alcohol Treatment Navigator - from the National [place] on Alcohol Abuse and  Alcoholism. Offers an online tool to help you find the right treatment for you - near you. Go to alcoholtreatment.niaaa.nih.gov.  [facility] National Hot[facility]  - from the Substance Abuse and Mental Health Services Administration. A free, confidential 24-hour treatment referral and information service for anyone facing mental and/or substance use disorders. Go to findtreatment.gov or call 5-290-742-HELP (8829).    Alcoholics Anonymous (AA) - offers group meetings and a 12-step program to anyone who has a drinking problem. Go to aa.org or call 392-076-7108    [facility] 2-1-1 - to find local programs and resources. Call 211 or go to [employer]    Other people you can talk to about treatment options include your primary care doctor, health insurance plan, local health department, or employee assistance program. You can also ask to talk to a hospital .         Home Preoperative Antibacterial Shower     What is a home preoperative antibacterial shower?  This shower is a way of cleaning the skin with a germ killing soap before surgery.  The soap contains  chlorhexidine, commonly known as CHG.  CHG is a soap for your skin with germ killing ability.  Let your doctor know if you are allergic to chlorhexidine.    Why do I need to take a preoperative antibacterial shower?  Skin is not sterile.  It is best to try to make your skin as free of germs as possible before surgery.  Proper cleansing with a germ killing soap before surgery can lower the number of germs on your skin.  This helps to reduce the risk of infection at the surgical site.  Following the instructions listed below will help you prepare your skin for surgery.      How do I use the CHG skin cleanser?  Steps:  Begin using your CHG soap five days before your scheduled surgery on ________________________.    Days 1-4 Shower before bed:  Wash your face and genitals with your normal soap and rinse.           2.    Apply the CHG soap to a clean wet washcloth.  Turn the water off or move away                From the water spray to avoid premature rinsing of the CHG soap as you are applying.     3.   Lather your entire body from the neck down.  Do not use on your face or genitals.  4. Pay special attention to the area(s) where your incision(s) will be located unless they are on your face.  Avoid scrubbing your skin too hard.  The important point is to have the CHG soap sit on your skin for 3 minutes.    When the 3 minutes are up, turn on the water and rinse the CHG soap off your body completely.   Pat yourself dry with a clean, freshly-laundered towel.  Dress in clean, freshly laundered night clothes.    Be sure to change bed sheets and blankets at least on the first night of CHG body wash use. May change linens every night of the above protocol for maximum benefit.   Day 5:  Last shower is the morning of surgery: Follow above Instructions.    NOTE:        *Keep CHG soap out of eyes and ear canals   *DO NOT wash with regular soap on your body after you have used the CHG soap solution  *DO NOT apply powders, lotions,  or perfume.  *Deodorant may be used days 1-4, BUT NOT the day of surgery          Patient Information: Pre-Operative Infection Prevention Measures     Why did I have my nose, under my arms and groin swabbed?  The purpose of the swab is to identify Staphylococcus aureus inside your nose or on your skin.  The swab was sent to the laboratory for culture.  A positive swab/culture for Staphylococcus aureus is called colonization or carriage.      What is Staphylococcus aureus?  Staphylococcus aureus, also known as “staph”, is a germ found on the skin or in the nose of healthy people.  Sometimes Staphylococcus aureus can get into the body and cause an infection.  This can be minor (such as pimples, boils or other skin problems).  It might also be serious (such as blood infection, pneumonia or a surgical site infection).    What is Staphylococcus aureus colonization or carriage?  Colonization or carriage means that a person has the germ but is not sick from it.  These bacteria can be spread on the hands or when breathing or sneezing.    How is Staphylococcus aureus spread?  It is most often spread by close contact with a person or item that carries it.    What happens if my culture is positive for Staphylococcus aureus?  Your doctor/medical team will use this information to guide any antibiotic treatment which may be necessary.  Regardless of the culture results, we will clean the inside of your nose with a betadine swab just before you have your surgery.      Will I get an infection if I have Staphylococcus aureus in my nose or on my skin?  Anyone can get an infection with Staphylococcus aureus.  However, the best way to reduce your risk of infection is to follow the instructions provided to you for the use of your CHG soap and dental rinse.            Patient Information: Oral/Dental Rinse  **This is a prescription; pick it up at your preferred local pharmacy **  What is oral/dental rinse?   It is a mouthwash. It is a  way of cleaning the mouth with a germ killing solution before your surgery.  The solution contains chlorhexidine, commonly known as CHG.   It is used inside the mouth to kill a bacteria known as Staphylococcus aureus.  Let your doctor know if you are allergic to Chlorhexidine.    Why do I need to use CHG oral/dental rinse?  The CHG oral/dental rinse helps to kill a bacteria in your mouth known a Staphylococcus aureus.     This reduces the risk of infection at the surgical site.      Using your CHG oral/dental rinse  STEPS:  Use your CHG oral/dental rinse after you brush your teeth the night before (at bedtime) and the morning of your surgery.  Follow all directions on your prescription label.    Use the cap on the container to measure 15ml (fill cap to fill line)  Swish (gargle if you can) the mouthwash in your mouth for at least 30 seconds, (do not to swallow) spit out  After you use your CHG rinse, do not rinse your mouth with water, drink or eat.  Please refer to prescription label for the appropriate time to resume oral intake  Dental rinse comes in one size bottle: 473ml ~16oz.  You will have leftover    rinse, discard after this use.    What side effects might I have using the CHG oral/dental rinse?  CHG rinse will stick to plaque on the teeth.  Brush and floss just before use.  Teeth brushing will help avoid staining of plaque during use.           Ensure Surgery Immuno-Nutrition Shake; This shake provides specialized nutrients to help prepare your body for surgery. Your surgeon's office may send it to your home, or you may receive it from [facility] if you are scheduled for an appointment.  If your surgeon has instructed you to begin the shakes and you have not received them at least 7 days before your scheduled surgery, please contact your surgeon's office. You should begin drinking your shakes 6 days before your surgery date, start on _______.  You should drink 1 carton three  times a day, you can have one carton with breakfast, lunch, and dinner.  If your surgeon has given you instructions to drink clear liquids the day before surgery, you can continue to drink your Ensure Surgery immune-nutrition shakes.     The Week before Surgery        Seven days before Surgery  Check your [facility] medication instructions  Do the exercises provided to you by CPM   Arrange for a responsible, adult licensed  to take you home after surgery and stay with you for 24 hours.  You will not be permitted to drive yourself home if you have received any anesthetic/sedation  Five days before surgery  Check your [facility] medication instructions  Do the exercises provided to you by [facility]   Start using Chlorhexidene (CHG) body wash if prescribed (Continue till day of surgery)      The Day before Surgery       Check your [facility] medication and all other [facility] instructions including when to stop eating and drinking  You will be called with your arrival time for surgery in the late afternoon.  If you do not receive a call please reach out to your surgeon's office.  Do not smoke or drink 24 hours before surgery  Prepare items to bring with you to the hospital  Shower with your chlorhexidine wash if prescribed  Brush your teeth and use your chlorhexidine dental rinse if prescribed    The Day of Surgery       Check your [facility] medication instructions  Ensure you follow the instructions for when to stop eating and drinking  Shower, if prescribed use CHG.  Do not apply any lotions, creams, moisturizers, perfume or deodorant  Brush your teeth and use your CHG dental rinse if prescribed  Wear loose comfortable clothing  Avoid make-up  Remove  jewelry and piercings, consider professional piercing removal with a plastic spacer if needed  Bring photo ID and Insurance card  Bring an accurate medication list that includes medication dose, frequency and allergies  Bring a copy of your advanced directives (will, health care power of  )  Bring any devices and controllers as well as medical devices you have been provided with for surgery (CPAP, slings, braces, etc.)  Dentures, eyeglasses, and contacts will be removed before surgery, please bring cases for contacts or glasses    Preoperative Deep Breathing Exercises    Why it is important to do deep breathing exercises before my surgery?  Deep breathing exercises strengthen your breathing muscles.  This helps you to recover after your surgery and decreases the chance of breathing complications.    How are the deep breathing exercises done?  Sit straight with your back supported.  Breathe in deeply and slowly through your nose. Your lower rib cage should expand and your abdomen may move forward.  Hold that breath for 3 to 5 seconds.  Breathe out through pursed lips, slowly and completely.  Rest and repeat 10 times every hour while awake.  Rest longer if you become dizzy or lightheaded.         Preoperative Brain Exercises    What are brain exercises?  A brain exercise is any activity that engages your thinking (cognitive) skills.    What types of activities are considered brain exercises?  Jigsaw puzzles, crossword puzzles, word jumble, memory games, word search, and many more.  Many can be found free online or on your phone via a mobile crow.    Why should I do brain exercises before my surgery?  More recent research has shown brain exercise before surgery can lower the risk of postoperative delirium (confusion) which can be especially important for older adults.  Patients who did brain exercises for 5 to 10 hours the days before surgery, cut their risk of postoperative delirium in half up to 1 week after surgery.    Sit-to-Stand Exercise    What is the sit-to-stand exercise?  The sit-to-stand exercise strengthens the muscles of your lower body and muscles in the center of your body (core muscles for stability) helping to maintain and improve your strength and mobility.  How do I do the  sit-to-stand exercise?  The goal is to do this exercise without using your arms or hands.  If this is too difficult, use your arms and hands or a chair with armrests to help slowly push yourself to the standing position and lower yourself back to the sitting position. As the movement becomes easier use your arms and hands less.    Steps to the sit-to-stand exercise  Sit up tall in a sturdy chair, knees bent, feet flat on the floor shoulder-width apart.  Shift your hips/pelvis forward in the chair to correctly position yourself for the next movement.  Lean forward at your hips.  Stand up straight putting equal weight on both feet.  Check to be sure you are properly aligned with the chair, in a slow controlled movement sit back down.  Repeat this exercise 10-15 times.  If needed you can do it fewer times until your strength improves.  Rest for 1 minute.  Do another 10-15 sit-to-stand exercises.  Try to do this in the morning and evening.       Simple things you can do to help prevent blood clots     Blood clots are blockages that can form in the body's veins. When a blood clot forms in your deep veins, it may be called a deep vein thrombosis, or DVT for short. Blood clots can happen in any part of the body where blood flows, but they are most common in the arms and legs. If a piece of a blood clot breaks free and travels to the lungs, it is called a pulmonary embolus (PE). A PE can be a very serious problem.      Being in the hospital or having surgery can raise your chances of getting a blood clot because you may not be well enough to move around as much as you normally do.         Ways you can help prevent blood clots in the hospital       Wearing SCDs  SCDs stands for Sequential Compression Devices.   SCDs are special sleeves that wrap around your legs. They attach to a pump that fills them with air to gently squeeze your legs every few minutes.  This helps return the blood in your legs to your heart.   SCDs should  only be taken off when walking or bathing. SCDs may not be comfortable, but they can help save your life.              Pump SCD leg sleeves  Wearing compression stockings - if your doctor orders them. These special snug-fitting stockings gently squeeze your legs to help blood flow.       Walking. Walking helps move the blood in your legs.   If your doctor says it is ok, try walking the halls at least   5 times a day. Ask us to help you get up, so you don't fall.      Taking any blood-thinning medicines your doctor orders.              Ways you can help prevent blood clots at home         Wearing compression stockings - if your doctor orders them.   Walking - to help move the blood in your legs.    Taking any blood-thinning medicines your doctor orders.      Signs of a blood clot or PE    Tell your doctor or nurse right away if you have any of the problems listed below.         If you are at home, seek medical care right away. Call 911 for chest pain or problems breathing.            Signs of a blood clot (DVT) - such as pain, swelling, redness, or warmth in your arm or legs.  Signs of a pulmonary embolism (PE) - such as chest pain or feeling short of breath

## 2025-06-08 LAB — STAPHYLOCOCCUS SPEC CULT: ABNORMAL

## 2025-06-10 NOTE — HOSPITAL COURSE
Gynecologic Surgery History and Physical    Subjective   Eulalia Oleary is a 50 y.o. with persistent AUB and endometrial hyperplasia w/o atypia presenting for total laparoscopic hysterectomy, bilateral salpingo-oophorectomy, any indicated procedures.    Imaging:   - Recent pelvic US 10/2024 showed uterus measuring 6cm x 4cm 8cm. EMS 1cm.   Path:   2024  A. ENDOMETRIUM, BIOPSY:    -- Non-atypical endometrial hyperplasia with squamous morular metaplasia and metaplastic changes, see comment.  -- Abundant mucus admixed with abundant acute infla  Labs:   - TSH 2023 WNL   Screening:   - Last pap  negative/negative, no hx of CIN2-3  - Last mammogram 2024 BIRADS 1  - Recent EMB w/ endometrial hyperplasia w/o atypia   PMHx: HTN, asthma, chronic back pain, no hx of DVT/PE   PSHx: wisdom teeth, L5 discectomy/laminectomy, laparoscopy in her 20s for endometriosis     Obstetrical History   OB History    Para Term  AB Living   0 0 0 0 0 0   SAB IAB Ectopic Multiple Live Births   0 0 0 0 0       Past Medical History  Past Medical History:   Diagnosis Date    Abnormal uterine bleeding (AUB)     Asthma     Benign essential hypertension 2003    Chronic back pain     Dysmenorrhea     Endometrial hyperplasia     HTN (hypertension)     Irritable bowel syndrome with diarrhea 2025    Lumbar herniated disc     L5    Lymphedema 2025    MDD (recurrent major depressive disorder) in remission 2025    Menorrhagia with irregular cycle     Morbid obesity (Multi)     BMI 52    Nephrolithiasis     one time in past, not since    Seasonal allergic rhinitis due to pollen 2015    Skin disorder     occational dermatitis        Past Surgical History   Past Surgical History:   Procedure Laterality Date    COLONOSCOPY      ENDOMETRIAL BIOPSY  2024    ESOPHAGOGASTRODUODENOSCOPY      LAPAROSCOPY DIAGNOSTIC / BIOPSY / ASPIRATION / LYSIS      in her 20's for suspected endometriosis    LUMBAR  DISCECTOMY  06/01/2016    x 2    LUMBAR LAMINECTOMY  03/2009    L5-S1 laminectomy/microdisectomy    WISDOM TOOTH EXTRACTION         Social History  Social History     Tobacco Use    Smoking status: Never    Smokeless tobacco: Never   Substance Use Topics    Alcohol use: Never     Substance and Sexual Activity   Drug Use Never       Allergies  Azithromycin and Gabapentin     Medications  No medications prior to admission.       ROS: negative except per HPI    Objective    Last Vitals  There were no vitals taken for this visit.    Physical Examination  General: no acute distress  HEENT: normocephalic, atraumatic  Heart: warm and well perfused  Lungs: breathing comfortably on room air  Abdomen: nondistended  Extremities: moving all extremities  Neuro: awake and conversant  Psych: appropriate mood and affect    Lab Review  Results from last 7 days   Lab Units 06/06/25  1112   HEMOGLOBIN g/dL 11.6*   HEMATOCRIT % 36.6   PLATELETS AUTO x10*3/uL 299   CREATININE mg/dL 0.64         Lab Results   Component Value Date    WBC 9.6 06/06/2025    HGB 11.6 (L) 06/06/2025    HCT 36.6 06/06/2025    MCV 91 06/06/2025     06/06/2025       Lab Results   Component Value Date    GLUCOSE 98 06/06/2025    CALCIUM 8.9 06/06/2025     06/06/2025    K 4.2 06/06/2025    CO2 25 06/06/2025     06/06/2025    BUN 14 06/06/2025    CREATININE 0.64 06/06/2025       Assessment/Plan     Eulalia Oleary is a 50 y.o. with persistent AUB and endometrial hyperplasia w/o atypia presenting for scheduled surgery.    Plan to proceed with total laparoscopic hysterectomy, bilateral salpingo-oophorectomy, any indicated procedures.  Surgical consent was reviewed. The risks of surgery were discussed including: bleeding (including need for blood transfusion in life-threatening situations; risks of transfusion), infection, damage to surrounding organs. The patient had the opportunity to answer questions and desired to proceed with surgery following  our discussion. Both verbal and written consents were obtained.      Helen Tristan MD  PGY-1, Obstetrics and Gynecology

## 2025-06-13 ENCOUNTER — ANESTHESIA EVENT (OUTPATIENT)
Dept: OPERATING ROOM | Facility: HOSPITAL | Age: 50
End: 2025-06-13
Payer: COMMERCIAL

## 2025-06-16 ENCOUNTER — ANESTHESIA (OUTPATIENT)
Dept: OPERATING ROOM | Facility: HOSPITAL | Age: 50
End: 2025-06-16
Payer: COMMERCIAL

## 2025-06-16 PROBLEM — E66.9 OBESITY: Status: ACTIVE | Noted: 2025-06-16

## 2025-06-16 PROBLEM — N20.0 NEPHROLITHIASIS: Status: ACTIVE | Noted: 2025-06-16

## 2025-06-16 LAB
ABO GROUP (TYPE) IN BLOOD: NORMAL
PREGNANCY TEST URINE, POC: NEGATIVE
RH FACTOR (ANTIGEN D): NORMAL

## 2025-06-16 PROCEDURE — 3600000009 HC OR TIME - EACH INCREMENTAL 1 MINUTE - PROCEDURE LEVEL FOUR: Performed by: STUDENT IN AN ORGANIZED HEALTH CARE EDUCATION/TRAINING PROGRAM

## 2025-06-16 PROCEDURE — 88307 TISSUE EXAM BY PATHOLOGIST: CPT | Performed by: PATHOLOGY

## 2025-06-16 PROCEDURE — 2500000004 HC RX 250 GENERAL PHARMACY W/ HCPCS (ALT 636 FOR OP/ED)

## 2025-06-16 PROCEDURE — 2500000004 HC RX 250 GENERAL PHARMACY W/ HCPCS (ALT 636 FOR OP/ED): Performed by: STUDENT IN AN ORGANIZED HEALTH CARE EDUCATION/TRAINING PROGRAM

## 2025-06-16 PROCEDURE — 96372 THER/PROPH/DIAG INJ SC/IM: CPT | Performed by: STUDENT IN AN ORGANIZED HEALTH CARE EDUCATION/TRAINING PROGRAM

## 2025-06-16 PROCEDURE — 36415 COLL VENOUS BLD VENIPUNCTURE: CPT | Performed by: ANESTHESIOLOGY

## 2025-06-16 PROCEDURE — 7100000001 HC RECOVERY ROOM TIME - INITIAL BASE CHARGE: Performed by: STUDENT IN AN ORGANIZED HEALTH CARE EDUCATION/TRAINING PROGRAM

## 2025-06-16 PROCEDURE — 2500000002 HC RX 250 W HCPCS SELF ADMINISTERED DRUGS (ALT 637 FOR MEDICARE OP, ALT 636 FOR OP/ED)

## 2025-06-16 PROCEDURE — 2500000005 HC RX 250 GENERAL PHARMACY W/O HCPCS

## 2025-06-16 PROCEDURE — 2780000003 HC OR 278 NO HCPCS: Performed by: STUDENT IN AN ORGANIZED HEALTH CARE EDUCATION/TRAINING PROGRAM

## 2025-06-16 PROCEDURE — 58571 TLH W/T/O 250 G OR LESS: CPT | Performed by: STUDENT IN AN ORGANIZED HEALTH CARE EDUCATION/TRAINING PROGRAM

## 2025-06-16 PROCEDURE — 1210000001 HC SEMI-PRIVATE ROOM DAILY

## 2025-06-16 PROCEDURE — 2500000004 HC RX 250 GENERAL PHARMACY W/ HCPCS (ALT 636 FOR OP/ED): Mod: JZ

## 2025-06-16 PROCEDURE — 2500000001 HC RX 250 WO HCPCS SELF ADMINISTERED DRUGS (ALT 637 FOR MEDICARE OP): Performed by: STUDENT IN AN ORGANIZED HEALTH CARE EDUCATION/TRAINING PROGRAM

## 2025-06-16 PROCEDURE — 3700000002 HC GENERAL ANESTHESIA TIME - EACH INCREMENTAL 1 MINUTE: Performed by: STUDENT IN AN ORGANIZED HEALTH CARE EDUCATION/TRAINING PROGRAM

## 2025-06-16 PROCEDURE — A58571 PR LAPAROSCOPY W TOT HYSTERECTUTERUS <=250 GRAM  W TUBE/OVARY: Performed by: ANESTHESIOLOGY

## 2025-06-16 PROCEDURE — 7100000002 HC RECOVERY ROOM TIME - EACH INCREMENTAL 1 MINUTE: Performed by: STUDENT IN AN ORGANIZED HEALTH CARE EDUCATION/TRAINING PROGRAM

## 2025-06-16 PROCEDURE — 81025 URINE PREGNANCY TEST: CPT | Performed by: STUDENT IN AN ORGANIZED HEALTH CARE EDUCATION/TRAINING PROGRAM

## 2025-06-16 PROCEDURE — 3700000001 HC GENERAL ANESTHESIA TIME - INITIAL BASE CHARGE: Performed by: STUDENT IN AN ORGANIZED HEALTH CARE EDUCATION/TRAINING PROGRAM

## 2025-06-16 PROCEDURE — 2720000007 HC OR 272 NO HCPCS: Performed by: STUDENT IN AN ORGANIZED HEALTH CARE EDUCATION/TRAINING PROGRAM

## 2025-06-16 PROCEDURE — 3600000004 HC OR TIME - INITIAL BASE CHARGE - PROCEDURE LEVEL FOUR: Performed by: STUDENT IN AN ORGANIZED HEALTH CARE EDUCATION/TRAINING PROGRAM

## 2025-06-16 PROCEDURE — 2500000005 HC RX 250 GENERAL PHARMACY W/O HCPCS: Performed by: STUDENT IN AN ORGANIZED HEALTH CARE EDUCATION/TRAINING PROGRAM

## 2025-06-16 PROCEDURE — 88307 TISSUE EXAM BY PATHOLOGIST: CPT | Mod: TC,SUR | Performed by: STUDENT IN AN ORGANIZED HEALTH CARE EDUCATION/TRAINING PROGRAM

## 2025-06-16 RX ORDER — IBUPROFEN 600 MG/1
600 TABLET, FILM COATED ORAL EVERY 6 HOURS
Status: DISCONTINUED | OUTPATIENT
Start: 2025-06-17 | End: 2025-06-18

## 2025-06-16 RX ORDER — HYDRALAZINE HYDROCHLORIDE 20 MG/ML
5 INJECTION INTRAMUSCULAR; INTRAVENOUS EVERY 30 MIN PRN
Status: DISCONTINUED | OUTPATIENT
Start: 2025-06-16 | End: 2025-06-16 | Stop reason: HOSPADM

## 2025-06-16 RX ORDER — POLYETHYLENE GLYCOL 3350 17 G/17G
17 POWDER, FOR SOLUTION ORAL DAILY
Status: DISCONTINUED | OUTPATIENT
Start: 2025-06-16 | End: 2025-06-18 | Stop reason: HOSPADM

## 2025-06-16 RX ORDER — PROPOFOL 10 MG/ML
INJECTION, EMULSION INTRAVENOUS AS NEEDED
Status: DISCONTINUED | OUTPATIENT
Start: 2025-06-16 | End: 2025-06-16

## 2025-06-16 RX ORDER — FENTANYL CITRATE 50 UG/ML
INJECTION, SOLUTION INTRAMUSCULAR; INTRAVENOUS AS NEEDED
Status: DISCONTINUED | OUTPATIENT
Start: 2025-06-16 | End: 2025-06-16

## 2025-06-16 RX ORDER — ACETAMINOPHEN 325 MG/1
975 TABLET ORAL ONCE
Status: COMPLETED | OUTPATIENT
Start: 2025-06-16 | End: 2025-06-16

## 2025-06-16 RX ORDER — HYDROMORPHONE HYDROCHLORIDE 0.2 MG/ML
0.2 INJECTION INTRAMUSCULAR; INTRAVENOUS; SUBCUTANEOUS EVERY 5 MIN PRN
Status: DISCONTINUED | OUTPATIENT
Start: 2025-06-16 | End: 2025-06-16 | Stop reason: HOSPADM

## 2025-06-16 RX ORDER — HYOSCYAMINE SULFATE 0.125 MG
0.12 TABLET ORAL EVERY 6 HOURS PRN
Status: DISCONTINUED | OUTPATIENT
Start: 2025-06-16 | End: 2025-06-18 | Stop reason: HOSPADM

## 2025-06-16 RX ORDER — SODIUM CHLORIDE, SODIUM LACTATE, POTASSIUM CHLORIDE, CALCIUM CHLORIDE 600; 310; 30; 20 MG/100ML; MG/100ML; MG/100ML; MG/100ML
40 INJECTION, SOLUTION INTRAVENOUS CONTINUOUS
Status: DISCONTINUED | OUTPATIENT
Start: 2025-06-16 | End: 2025-06-17

## 2025-06-16 RX ORDER — KETAMINE HYDROCHLORIDE 10 MG/ML
INJECTION, SOLUTION INTRAMUSCULAR; INTRAVENOUS AS NEEDED
Status: DISCONTINUED | OUTPATIENT
Start: 2025-06-16 | End: 2025-06-16

## 2025-06-16 RX ORDER — ONDANSETRON HYDROCHLORIDE 2 MG/ML
4 INJECTION, SOLUTION INTRAVENOUS EVERY 8 HOURS PRN
Status: CANCELLED | OUTPATIENT
Start: 2025-06-16

## 2025-06-16 RX ORDER — LIDOCAINE HYDROCHLORIDE 20 MG/ML
INJECTION, SOLUTION INFILTRATION; PERINEURAL AS NEEDED
Status: DISCONTINUED | OUTPATIENT
Start: 2025-06-16 | End: 2025-06-16

## 2025-06-16 RX ORDER — ACETAMINOPHEN 325 MG/1
975 TABLET ORAL EVERY 6 HOURS
Status: DISCONTINUED | OUTPATIENT
Start: 2025-06-16 | End: 2025-06-18 | Stop reason: HOSPADM

## 2025-06-16 RX ORDER — ACETAMINOPHEN 325 MG/1
975 TABLET ORAL EVERY 6 HOURS PRN
Status: DISCONTINUED | OUTPATIENT
Start: 2025-06-16 | End: 2025-06-16 | Stop reason: HOSPADM

## 2025-06-16 RX ORDER — LABETALOL HYDROCHLORIDE 5 MG/ML
5 INJECTION, SOLUTION INTRAVENOUS ONCE AS NEEDED
Status: DISCONTINUED | OUTPATIENT
Start: 2025-06-16 | End: 2025-06-16 | Stop reason: HOSPADM

## 2025-06-16 RX ORDER — ALBUTEROL SULFATE 90 UG/1
2 INHALANT RESPIRATORY (INHALATION) 4 TIMES DAILY PRN
Status: DISCONTINUED | OUTPATIENT
Start: 2025-06-16 | End: 2025-06-18 | Stop reason: HOSPADM

## 2025-06-16 RX ORDER — NALOXONE HYDROCHLORIDE 0.4 MG/ML
0.1 INJECTION, SOLUTION INTRAMUSCULAR; INTRAVENOUS; SUBCUTANEOUS EVERY 5 MIN PRN
Status: DISCONTINUED | OUTPATIENT
Start: 2025-06-16 | End: 2025-06-18 | Stop reason: HOSPADM

## 2025-06-16 RX ORDER — CEFAZOLIN 1 G/1
INJECTION, POWDER, FOR SOLUTION INTRAVENOUS AS NEEDED
Status: DISCONTINUED | OUTPATIENT
Start: 2025-06-16 | End: 2025-06-16

## 2025-06-16 RX ORDER — ONDANSETRON HYDROCHLORIDE 2 MG/ML
4 INJECTION, SOLUTION INTRAVENOUS EVERY 6 HOURS PRN
Status: DISCONTINUED | OUTPATIENT
Start: 2025-06-16 | End: 2025-06-18 | Stop reason: HOSPADM

## 2025-06-16 RX ORDER — MIDAZOLAM HYDROCHLORIDE 1 MG/ML
INJECTION INTRAMUSCULAR; INTRAVENOUS AS NEEDED
Status: DISCONTINUED | OUTPATIENT
Start: 2025-06-16 | End: 2025-06-16

## 2025-06-16 RX ORDER — APREPITANT 40 MG/1
CAPSULE ORAL AS NEEDED
Status: DISCONTINUED | OUTPATIENT
Start: 2025-06-16 | End: 2025-06-16

## 2025-06-16 RX ORDER — KETOROLAC TROMETHAMINE 15 MG/ML
15 INJECTION, SOLUTION INTRAMUSCULAR; INTRAVENOUS EVERY 6 HOURS
Status: COMPLETED | OUTPATIENT
Start: 2025-06-16 | End: 2025-06-17

## 2025-06-16 RX ORDER — OXYCODONE HYDROCHLORIDE 5 MG/1
10 TABLET ORAL EVERY 4 HOURS PRN
Status: DISCONTINUED | OUTPATIENT
Start: 2025-06-16 | End: 2025-06-18 | Stop reason: HOSPADM

## 2025-06-16 RX ORDER — PHENYLEPHRINE HCL IN 0.9% NACL 0.4MG/10ML
SYRINGE (ML) INTRAVENOUS AS NEEDED
Status: DISCONTINUED | OUTPATIENT
Start: 2025-06-16 | End: 2025-06-16

## 2025-06-16 RX ORDER — FLUTICASONE FUROATE AND VILANTEROL 200; 25 UG/1; UG/1
1 POWDER RESPIRATORY (INHALATION)
Status: DISCONTINUED | OUTPATIENT
Start: 2025-06-17 | End: 2025-06-18 | Stop reason: HOSPADM

## 2025-06-16 RX ORDER — OXYCODONE HYDROCHLORIDE 5 MG/1
5 TABLET ORAL EVERY 4 HOURS PRN
Status: DISCONTINUED | OUTPATIENT
Start: 2025-06-16 | End: 2025-06-18 | Stop reason: HOSPADM

## 2025-06-16 RX ORDER — FUROSEMIDE 10 MG/ML
INJECTION INTRAMUSCULAR; INTRAVENOUS AS NEEDED
Status: DISCONTINUED | OUTPATIENT
Start: 2025-06-16 | End: 2025-06-16

## 2025-06-16 RX ORDER — ONDANSETRON HYDROCHLORIDE 2 MG/ML
INJECTION, SOLUTION INTRAVENOUS AS NEEDED
Status: DISCONTINUED | OUTPATIENT
Start: 2025-06-16 | End: 2025-06-16

## 2025-06-16 RX ORDER — GLYCOPYRROLATE 0.2 MG/ML
INJECTION INTRAMUSCULAR; INTRAVENOUS AS NEEDED
Status: DISCONTINUED | OUTPATIENT
Start: 2025-06-16 | End: 2025-06-16

## 2025-06-16 RX ORDER — KETOROLAC TROMETHAMINE 30 MG/ML
INJECTION, SOLUTION INTRAMUSCULAR; INTRAVENOUS AS NEEDED
Status: DISCONTINUED | OUTPATIENT
Start: 2025-06-16 | End: 2025-06-16

## 2025-06-16 RX ORDER — ONDANSETRON 4 MG/1
4 TABLET, FILM COATED ORAL EVERY 8 HOURS PRN
Status: CANCELLED | OUTPATIENT
Start: 2025-06-16

## 2025-06-16 RX ORDER — SIMETHICONE 80 MG
80 TABLET,CHEWABLE ORAL 4 TIMES DAILY PRN
Status: DISCONTINUED | OUTPATIENT
Start: 2025-06-16 | End: 2025-06-18 | Stop reason: HOSPADM

## 2025-06-16 RX ORDER — OXYCODONE HYDROCHLORIDE 5 MG/1
10 TABLET ORAL EVERY 4 HOURS PRN
Status: DISCONTINUED | OUTPATIENT
Start: 2025-06-16 | End: 2025-06-16 | Stop reason: HOSPADM

## 2025-06-16 RX ORDER — FAMOTIDINE 20 MG/1
20 TABLET, FILM COATED ORAL 2 TIMES DAILY
Status: DISCONTINUED | OUTPATIENT
Start: 2025-06-16 | End: 2025-06-18 | Stop reason: HOSPADM

## 2025-06-16 RX ORDER — ONDANSETRON 4 MG/1
4 TABLET, ORALLY DISINTEGRATING ORAL EVERY 6 HOURS PRN
Status: DISCONTINUED | OUTPATIENT
Start: 2025-06-16 | End: 2025-06-18 | Stop reason: HOSPADM

## 2025-06-16 RX ORDER — SODIUM CHLORIDE, SODIUM LACTATE, POTASSIUM CHLORIDE, CALCIUM CHLORIDE 600; 310; 30; 20 MG/100ML; MG/100ML; MG/100ML; MG/100ML
75 INJECTION, SOLUTION INTRAVENOUS CONTINUOUS
Status: DISCONTINUED | OUTPATIENT
Start: 2025-06-16 | End: 2025-06-16 | Stop reason: HOSPADM

## 2025-06-16 RX ORDER — METRONIDAZOLE 500 MG/100ML
500 INJECTION, SOLUTION INTRAVENOUS ONCE
Status: COMPLETED | OUTPATIENT
Start: 2025-06-16 | End: 2025-06-16

## 2025-06-16 RX ORDER — HYDROMORPHONE HYDROCHLORIDE 1 MG/ML
INJECTION, SOLUTION INTRAMUSCULAR; INTRAVENOUS; SUBCUTANEOUS AS NEEDED
Status: DISCONTINUED | OUTPATIENT
Start: 2025-06-16 | End: 2025-06-16

## 2025-06-16 RX ORDER — DROPERIDOL 2.5 MG/ML
0.62 INJECTION, SOLUTION INTRAMUSCULAR; INTRAVENOUS ONCE AS NEEDED
Status: DISCONTINUED | OUTPATIENT
Start: 2025-06-16 | End: 2025-06-16 | Stop reason: HOSPADM

## 2025-06-16 RX ORDER — OXYCODONE HYDROCHLORIDE 5 MG/1
5 TABLET ORAL EVERY 4 HOURS PRN
Status: DISCONTINUED | OUTPATIENT
Start: 2025-06-16 | End: 2025-06-16 | Stop reason: HOSPADM

## 2025-06-16 RX ORDER — ROCURONIUM BROMIDE 10 MG/ML
INJECTION, SOLUTION INTRAVENOUS AS NEEDED
Status: DISCONTINUED | OUTPATIENT
Start: 2025-06-16 | End: 2025-06-16

## 2025-06-16 RX ORDER — SODIUM CHLORIDE 0.9 G/100ML
INJECTION, SOLUTION IRRIGATION AS NEEDED
Status: DISCONTINUED | OUTPATIENT
Start: 2025-06-16 | End: 2025-06-16 | Stop reason: HOSPADM

## 2025-06-16 RX ORDER — CELECOXIB 200 MG/1
400 CAPSULE ORAL ONCE
Status: COMPLETED | OUTPATIENT
Start: 2025-06-16 | End: 2025-06-16

## 2025-06-16 RX ORDER — HEPARIN SODIUM 5000 [USP'U]/ML
5000 INJECTION, SOLUTION INTRAVENOUS; SUBCUTANEOUS ONCE
Status: COMPLETED | OUTPATIENT
Start: 2025-06-16 | End: 2025-06-16

## 2025-06-16 RX ORDER — LANOLIN ALCOHOL/MO/W.PET/CERES
400 CREAM (GRAM) TOPICAL DAILY
Status: DISCONTINUED | OUTPATIENT
Start: 2025-06-16 | End: 2025-06-18 | Stop reason: HOSPADM

## 2025-06-16 RX ORDER — ENOXAPARIN SODIUM 100 MG/ML
60 INJECTION SUBCUTANEOUS EVERY 12 HOURS SCHEDULED
Status: DISCONTINUED | OUTPATIENT
Start: 2025-06-17 | End: 2025-06-18 | Stop reason: HOSPADM

## 2025-06-16 RX ORDER — BUPIVACAINE HYDROCHLORIDE 5 MG/ML
INJECTION, SOLUTION PERINEURAL AS NEEDED
Status: DISCONTINUED | OUTPATIENT
Start: 2025-06-16 | End: 2025-06-16 | Stop reason: HOSPADM

## 2025-06-16 RX ORDER — MONTELUKAST SODIUM 10 MG/1
10 TABLET ORAL DAILY
Status: DISCONTINUED | OUTPATIENT
Start: 2025-06-16 | End: 2025-06-18 | Stop reason: HOSPADM

## 2025-06-16 RX ORDER — ALBUTEROL SULFATE 0.83 MG/ML
2.5 SOLUTION RESPIRATORY (INHALATION) ONCE AS NEEDED
Status: DISCONTINUED | OUTPATIENT
Start: 2025-06-16 | End: 2025-06-16 | Stop reason: HOSPADM

## 2025-06-16 RX ORDER — ONDANSETRON HYDROCHLORIDE 2 MG/ML
4 INJECTION, SOLUTION INTRAVENOUS ONCE AS NEEDED
Status: DISCONTINUED | OUTPATIENT
Start: 2025-06-16 | End: 2025-06-16 | Stop reason: HOSPADM

## 2025-06-16 RX ADMIN — FAMOTIDINE 20 MG: 20 TABLET, FILM COATED ORAL at 20:20

## 2025-06-16 RX ADMIN — Medication 20 MG: at 12:37

## 2025-06-16 RX ADMIN — CELECOXIB 400 MG: 200 CAPSULE ORAL at 11:36

## 2025-06-16 RX ADMIN — LIDOCAINE HYDROCHLORIDE 100 MG: 20 INJECTION, SOLUTION INFILTRATION; PERINEURAL at 12:33

## 2025-06-16 RX ADMIN — SIMETHICONE 80 MG: 80 TABLET, CHEWABLE ORAL at 17:00

## 2025-06-16 RX ADMIN — Medication 30 MG: at 12:33

## 2025-06-16 RX ADMIN — CEFAZOLIN 3 G: 1 INJECTION, POWDER, FOR SOLUTION INTRAMUSCULAR; INTRAVENOUS at 12:46

## 2025-06-16 RX ADMIN — KETOROLAC TROMETHAMINE 30 MG: 30 INJECTION, SOLUTION INTRAMUSCULAR; INTRAVENOUS at 14:37

## 2025-06-16 RX ADMIN — FENTANYL CITRATE 100 MCG: 50 INJECTION, SOLUTION INTRAMUSCULAR; INTRAVENOUS at 12:33

## 2025-06-16 RX ADMIN — Medication 6 L/MIN: at 14:58

## 2025-06-16 RX ADMIN — APREPITANT 40 MG: 40 CAPSULE ORAL at 12:00

## 2025-06-16 RX ADMIN — ONDANSETRON 4 MG: 2 INJECTION INTRAMUSCULAR; INTRAVENOUS at 17:00

## 2025-06-16 RX ADMIN — KETOROLAC TROMETHAMINE 15 MG: 15 INJECTION, SOLUTION INTRAMUSCULAR; INTRAVENOUS at 20:20

## 2025-06-16 RX ADMIN — HYDROMORPHONE HYDROCHLORIDE 0.5 MG: 1 INJECTION, SOLUTION INTRAMUSCULAR; INTRAVENOUS; SUBCUTANEOUS at 14:25

## 2025-06-16 RX ADMIN — PROPOFOL 50 MG: 10 INJECTION, EMULSION INTRAVENOUS at 12:37

## 2025-06-16 RX ADMIN — SODIUM CHLORIDE, SODIUM LACTATE, POTASSIUM CHLORIDE, AND CALCIUM CHLORIDE 40 ML/HR: .6; .31; .03; .02 INJECTION, SOLUTION INTRAVENOUS at 17:47

## 2025-06-16 RX ADMIN — SODIUM CHLORIDE, SODIUM LACTATE, POTASSIUM CHLORIDE, AND CALCIUM CHLORIDE: 600; 310; 30; 20 INJECTION, SOLUTION INTRAVENOUS at 12:22

## 2025-06-16 RX ADMIN — PROPOFOL 150 MG: 10 INJECTION, EMULSION INTRAVENOUS at 12:33

## 2025-06-16 RX ADMIN — ONDANSETRON 4 MG: 2 INJECTION INTRAMUSCULAR; INTRAVENOUS at 14:39

## 2025-06-16 RX ADMIN — HYDROMORPHONE HYDROCHLORIDE 0.3 MG: 1 INJECTION, SOLUTION INTRAMUSCULAR; INTRAVENOUS; SUBCUTANEOUS at 14:47

## 2025-06-16 RX ADMIN — POLYETHYLENE GLYCOL 3350 17 G: 17 POWDER, FOR SOLUTION ORAL at 17:00

## 2025-06-16 RX ADMIN — ROCURONIUM BROMIDE 50 MG: 10 INJECTION, SOLUTION INTRAVENOUS at 12:34

## 2025-06-16 RX ADMIN — FUROSEMIDE 5 MG: 10 INJECTION, SOLUTION INTRAMUSCULAR; INTRAVENOUS at 14:35

## 2025-06-16 RX ADMIN — ROCURONIUM BROMIDE 10 MG: 10 INJECTION, SOLUTION INTRAVENOUS at 14:10

## 2025-06-16 RX ADMIN — ROCURONIUM BROMIDE 20 MG: 10 INJECTION, SOLUTION INTRAVENOUS at 13:26

## 2025-06-16 RX ADMIN — HYDROMORPHONE HYDROCHLORIDE 0.5 MG: 1 INJECTION, SOLUTION INTRAMUSCULAR; INTRAVENOUS; SUBCUTANEOUS at 15:40

## 2025-06-16 RX ADMIN — ACETAMINOPHEN 975 MG: 325 TABLET ORAL at 17:00

## 2025-06-16 RX ADMIN — MAGNESIUM OXIDE TAB 400 MG (241.3 MG ELEMENTAL MG) 1 TABLET: 400 (241.3 MG) TAB at 17:43

## 2025-06-16 RX ADMIN — ACETAMINOPHEN 975 MG: 325 TABLET ORAL at 23:12

## 2025-06-16 RX ADMIN — HYDROMORPHONE HYDROCHLORIDE 0.2 MG: 1 INJECTION, SOLUTION INTRAMUSCULAR; INTRAVENOUS; SUBCUTANEOUS at 14:43

## 2025-06-16 RX ADMIN — Medication 80 MCG: at 13:32

## 2025-06-16 RX ADMIN — MIDAZOLAM HYDROCHLORIDE 2 MG: 2 INJECTION, SOLUTION INTRAMUSCULAR; INTRAVENOUS at 12:22

## 2025-06-16 RX ADMIN — MONTELUKAST 10 MG: 10 TABLET, FILM COATED ORAL at 20:20

## 2025-06-16 RX ADMIN — ACETAMINOPHEN 975 MG: 325 TABLET ORAL at 11:36

## 2025-06-16 RX ADMIN — Medication 120 MCG: at 13:14

## 2025-06-16 RX ADMIN — OXYCODONE 5 MG: 5 TABLET ORAL at 20:19

## 2025-06-16 RX ADMIN — HEPARIN SODIUM 5000 UNITS: 5000 INJECTION, SOLUTION INTRAVENOUS; SUBCUTANEOUS at 11:36

## 2025-06-16 RX ADMIN — GLYCOPYRROLATE 0.2 MG: 0.2 SOLUTION INTRAMUSCULAR; INTRAVENOUS at 13:08

## 2025-06-16 RX ADMIN — DEXAMETHASONE SODIUM PHOSPHATE 8 MG: 4 INJECTION INTRA-ARTICULAR; INTRALESIONAL; INTRAMUSCULAR; INTRAVENOUS; SOFT TISSUE at 12:45

## 2025-06-16 RX ADMIN — METRONIDAZOLE 500 MG: 5 INJECTION, SOLUTION INTRAVENOUS at 12:40

## 2025-06-16 RX ADMIN — GLYCOPYRROLATE 0.2 MG: 0.2 SOLUTION INTRAMUSCULAR; INTRAVENOUS at 13:14

## 2025-06-16 SDOH — SOCIAL STABILITY: SOCIAL INSECURITY: ARE YOU OR HAVE YOU BEEN THREATENED OR ABUSED PHYSICALLY, EMOTIONALLY, OR SEXUALLY BY ANYONE?: NO

## 2025-06-16 SDOH — SOCIAL STABILITY: SOCIAL INSECURITY: DO YOU FEEL ANYONE HAS EXPLOITED OR TAKEN ADVANTAGE OF YOU FINANCIALLY OR OF YOUR PERSONAL PROPERTY?: NO

## 2025-06-16 SDOH — ECONOMIC STABILITY: INCOME INSECURITY: IN THE PAST 12 MONTHS HAS THE ELECTRIC, GAS, OIL, OR WATER COMPANY THREATENED TO SHUT OFF SERVICES IN YOUR HOME?: NO

## 2025-06-16 SDOH — SOCIAL STABILITY: SOCIAL INSECURITY: WITHIN THE LAST YEAR, HAVE YOU BEEN AFRAID OF YOUR PARTNER OR EX-PARTNER?: NO

## 2025-06-16 SDOH — ECONOMIC STABILITY: FOOD INSECURITY: WITHIN THE PAST 12 MONTHS, YOU WORRIED THAT YOUR FOOD WOULD RUN OUT BEFORE YOU GOT THE MONEY TO BUY MORE.: NEVER TRUE

## 2025-06-16 SDOH — ECONOMIC STABILITY: FOOD INSECURITY: WITHIN THE PAST 12 MONTHS, THE FOOD YOU BOUGHT JUST DIDN'T LAST AND YOU DIDN'T HAVE MONEY TO GET MORE.: NEVER TRUE

## 2025-06-16 SDOH — SOCIAL STABILITY: SOCIAL INSECURITY
WITHIN THE LAST YEAR, HAVE YOU BEEN RAPED OR FORCED TO HAVE ANY KIND OF SEXUAL ACTIVITY BY YOUR PARTNER OR EX-PARTNER?: NO

## 2025-06-16 SDOH — ECONOMIC STABILITY: HOUSING INSECURITY: IN THE PAST 12 MONTHS, HOW MANY TIMES HAVE YOU MOVED WHERE YOU WERE LIVING?: 0

## 2025-06-16 SDOH — SOCIAL STABILITY: SOCIAL INSECURITY
WITHIN THE LAST YEAR, HAVE YOU BEEN KICKED, HIT, SLAPPED, OR OTHERWISE PHYSICALLY HURT BY YOUR PARTNER OR EX-PARTNER?: NO

## 2025-06-16 SDOH — SOCIAL STABILITY: SOCIAL INSECURITY: WERE YOU ABLE TO COMPLETE ALL THE BEHAVIORAL HEALTH SCREENINGS?: YES

## 2025-06-16 SDOH — SOCIAL STABILITY: SOCIAL INSECURITY: WITHIN THE LAST YEAR, HAVE YOU BEEN HUMILIATED OR EMOTIONALLY ABUSED IN OTHER WAYS BY YOUR PARTNER OR EX-PARTNER?: NO

## 2025-06-16 SDOH — SOCIAL STABILITY: SOCIAL INSECURITY: HAVE YOU HAD ANY THOUGHTS OF HARMING ANYONE ELSE?: NO

## 2025-06-16 SDOH — SOCIAL STABILITY: SOCIAL INSECURITY: ABUSE: ADULT

## 2025-06-16 SDOH — SOCIAL STABILITY: SOCIAL INSECURITY: HAS ANYONE EVER THREATENED TO HURT YOUR FAMILY OR YOUR PETS?: NO

## 2025-06-16 SDOH — SOCIAL STABILITY: SOCIAL INSECURITY: ARE THERE ANY APPARENT SIGNS OF INJURIES/BEHAVIORS THAT COULD BE RELATED TO ABUSE/NEGLECT?: YES

## 2025-06-16 SDOH — SOCIAL STABILITY: SOCIAL INSECURITY: DOES ANYONE TRY TO KEEP YOU FROM HAVING/CONTACTING OTHER FRIENDS OR DOING THINGS OUTSIDE YOUR HOME?: NO

## 2025-06-16 SDOH — SOCIAL STABILITY: SOCIAL INSECURITY: DO YOU FEEL UNSAFE GOING BACK TO THE PLACE WHERE YOU ARE LIVING?: NO

## 2025-06-16 SDOH — ECONOMIC STABILITY: HOUSING INSECURITY: DO YOU FEEL UNSAFE GOING BACK TO THE PLACE WHERE YOU LIVE?: NO

## 2025-06-16 SDOH — SOCIAL STABILITY: SOCIAL INSECURITY: HAVE YOU HAD THOUGHTS OF HARMING ANYONE ELSE?: NO

## 2025-06-16 ASSESSMENT — PAIN - FUNCTIONAL ASSESSMENT
PAIN_FUNCTIONAL_ASSESSMENT: 0-10
PAIN_FUNCTIONAL_ASSESSMENT: 0-10
PAIN_FUNCTIONAL_ASSESSMENT: UNABLE TO SELF-REPORT
PAIN_FUNCTIONAL_ASSESSMENT: UNABLE TO SELF-REPORT
PAIN_FUNCTIONAL_ASSESSMENT: 0-10
PAIN_FUNCTIONAL_ASSESSMENT: UNABLE TO SELF-REPORT
PAIN_FUNCTIONAL_ASSESSMENT: 0-10

## 2025-06-16 ASSESSMENT — PAIN SCALES - GENERAL
PAINLEVEL_OUTOF10: 7
PAINLEVEL_OUTOF10: 4
PAINLEVEL_OUTOF10: 3
PAINLEVEL_OUTOF10: 6
PAINLEVEL_OUTOF10: 5 - MODERATE PAIN
PAINLEVEL_OUTOF10: 6
PAINLEVEL_OUTOF10: 0 - NO PAIN
PAINLEVEL_OUTOF10: 6
PAINLEVEL_OUTOF10: 7
PAINLEVEL_OUTOF10: 4
PAINLEVEL_OUTOF10: 0 - NO PAIN

## 2025-06-16 ASSESSMENT — COGNITIVE AND FUNCTIONAL STATUS - GENERAL
PATIENT BASELINE BEDBOUND: NO
CLIMB 3 TO 5 STEPS WITH RAILING: A LITTLE
CLIMB 3 TO 5 STEPS WITH RAILING: A LITTLE
DAILY ACTIVITIY SCORE: 24
MOBILITY SCORE: 22
MOBILITY SCORE: 22
WALKING IN HOSPITAL ROOM: A LITTLE
DAILY ACTIVITIY SCORE: 24
WALKING IN HOSPITAL ROOM: A LITTLE

## 2025-06-16 ASSESSMENT — PAIN DESCRIPTION - DESCRIPTORS
DESCRIPTORS: SORE
DESCRIPTORS: SORE
DESCRIPTORS: SORE;SHARP
DESCRIPTORS: SORE;SHARP
DESCRIPTORS: SORE
DESCRIPTORS: SORE

## 2025-06-16 ASSESSMENT — LIFESTYLE VARIABLES
HOW OFTEN DO YOU HAVE 6 OR MORE DRINKS ON ONE OCCASION: NEVER
SUBSTANCE_ABUSE_PAST_12_MONTHS: NO
SKIP TO QUESTIONS 9-10: 1
PRESCIPTION_ABUSE_PAST_12_MONTHS: NO
HOW MANY STANDARD DRINKS CONTAINING ALCOHOL DO YOU HAVE ON A TYPICAL DAY: PATIENT DOES NOT DRINK
AUDIT-C TOTAL SCORE: 0
AUDIT-C TOTAL SCORE: 0
HOW OFTEN DO YOU HAVE A DRINK CONTAINING ALCOHOL: NEVER

## 2025-06-16 ASSESSMENT — COLUMBIA-SUICIDE SEVERITY RATING SCALE - C-SSRS
6. HAVE YOU EVER DONE ANYTHING, STARTED TO DO ANYTHING, OR PREPARED TO DO ANYTHING TO END YOUR LIFE?: NO
1. IN THE PAST MONTH, HAVE YOU WISHED YOU WERE DEAD OR WISHED YOU COULD GO TO SLEEP AND NOT WAKE UP?: NO
2. HAVE YOU ACTUALLY HAD ANY THOUGHTS OF KILLING YOURSELF?: NO

## 2025-06-16 ASSESSMENT — ACTIVITIES OF DAILY LIVING (ADL)
BATHING: INDEPENDENT
WALKS IN HOME: INDEPENDENT
ADEQUATE_TO_COMPLETE_ADL: YES
GROOMING: INDEPENDENT
LACK_OF_TRANSPORTATION: NO
TOILETING: INDEPENDENT
PATIENT'S MEMORY ADEQUATE TO SAFELY COMPLETE DAILY ACTIVITIES?: YES
DRESSING YOURSELF: INDEPENDENT
HEARING - RIGHT EAR: FUNCTIONAL
HEARING - LEFT EAR: FUNCTIONAL
FEEDING YOURSELF: INDEPENDENT
JUDGMENT_ADEQUATE_SAFELY_COMPLETE_DAILY_ACTIVITIES: YES

## 2025-06-16 ASSESSMENT — PAIN DESCRIPTION - ORIENTATION
ORIENTATION: LOWER
ORIENTATION: LOWER

## 2025-06-16 ASSESSMENT — PATIENT HEALTH QUESTIONNAIRE - PHQ9
SUM OF ALL RESPONSES TO PHQ9 QUESTIONS 1 & 2: 0
1. LITTLE INTEREST OR PLEASURE IN DOING THINGS: NOT AT ALL
2. FEELING DOWN, DEPRESSED OR HOPELESS: NOT AT ALL

## 2025-06-16 ASSESSMENT — PAIN DESCRIPTION - LOCATION
LOCATION: ABDOMEN

## 2025-06-16 NOTE — H&P
Gynecologic Surgery History and Physical    Subjective   Eulalia Oleary is a 50 y.o. with persistent AUB and endometrial hyperplasia w/o atypia presenting for total laparoscopic hysterectomy, bilateral salpingo-oophorectomy, any indicated procedures.    Imaging:   - Recent pelvic US 10/2024 showed uterus measuring 6cm x 4cm 8cm. EMS 1cm.   Path:   2024  A. ENDOMETRIUM, BIOPSY:    -- Non-atypical endometrial hyperplasia with squamous morular metaplasia and metaplastic changes, see comment.  -- Abundant mucus admixed with abundant acute infla  Labs:   - TSH 2023 WNL   Screening:   - Last pap  negative/negative, no hx of CIN2-3  - Last mammogram 2024 BIRADS 1  - Recent EMB w/ endometrial hyperplasia w/o atypia   PMHx: HTN, asthma, chronic back pain, no hx of DVT/PE   PSHx: wisdom teeth, L5 discectomy/laminectomy, laparoscopy in her 20s for endometriosis     Obstetrical History   OB History    Para Term  AB Living   0 0 0 0 0 0   SAB IAB Ectopic Multiple Live Births   0 0 0 0 0       Past Medical History  Past Medical History:   Diagnosis Date    Abnormal uterine bleeding (AUB)     Asthma     Benign essential hypertension 2003    Chronic back pain     Dysmenorrhea     Endometrial hyperplasia     HTN (hypertension)     Irritable bowel syndrome with diarrhea 2025    Lumbar herniated disc     L5    Lymphedema 2025    MDD (recurrent major depressive disorder) in remission 2025    Menorrhagia with irregular cycle     Morbid obesity (Multi)     BMI 52    Nephrolithiasis     one time in past, not since    Seasonal allergic rhinitis due to pollen 2015    Skin disorder     occational dermatitis        Past Surgical History   Past Surgical History:   Procedure Laterality Date    COLONOSCOPY      ENDOMETRIAL BIOPSY  2024    ESOPHAGOGASTRODUODENOSCOPY      LAPAROSCOPY DIAGNOSTIC / BIOPSY / ASPIRATION / LYSIS      in her 20's for suspected endometriosis    LUMBAR  DISCECTOMY  06/01/2016    x 2    LUMBAR LAMINECTOMY  03/2009    L5-S1 laminectomy/microdisectomy    WISDOM TOOTH EXTRACTION         Social History  Social History     Tobacco Use    Smoking status: Never    Smokeless tobacco: Never   Substance Use Topics    Alcohol use: Never     Substance and Sexual Activity   Drug Use Never       Allergies  Azithromycin and Gabapentin     Medications  No medications prior to admission.       ROS: negative except per HPI    Objective    Last Vitals  Vitals reviewed      Physical Examination  General: no acute distress  HEENT: normocephalic, atraumatic  Heart: warm and well perfused  Lungs: breathing comfortably on room air  Abdomen: nondistended  Extremities: moving all extremities  Neuro: awake and conversant  Psych: appropriate mood and affect    Lab Review  Results from last 7 days   Lab Units 06/06/25  1112   HEMOGLOBIN g/dL 11.6*   HEMATOCRIT % 36.6   PLATELETS AUTO x10*3/uL 299   CREATININE mg/dL 0.64         Lab Results   Component Value Date    WBC 9.6 06/06/2025    HGB 11.6 (L) 06/06/2025    HCT 36.6 06/06/2025    MCV 91 06/06/2025     06/06/2025       Lab Results   Component Value Date    GLUCOSE 98 06/06/2025    CALCIUM 8.9 06/06/2025     06/06/2025    K 4.2 06/06/2025    CO2 25 06/06/2025     06/06/2025    BUN 14 06/06/2025    CREATININE 0.64 06/06/2025       Assessment/Plan     Eulalia Oleary is a 50 y.o. with persistent AUB and endometrial hyperplasia w/o atypia presenting for scheduled surgery.    Plan to proceed with total laparoscopic hysterectomy, bilateral salpingo-oophorectomy, any indicated procedures.  Surgical consent was reviewed. The risks of surgery were discussed including: bleeding (including need for blood transfusion in life-threatening situations; risks of transfusion), infection, damage to surrounding organs. The patient had the opportunity to answer questions and desired to proceed with surgery following our discussion. Both  verbal and written consents were obtained.      Augusta Nur MD   PGY-2, Obstetrics and Gynecology

## 2025-06-16 NOTE — ANESTHESIA POSTPROCEDURE EVALUATION
Patient: Eulalia Oleary    Procedure Summary       Date: 06/16/25 Room / Location: [facility] OR 05 / [facility]    Anesthesia Start: 1220 Anesthesia Stop: 1502    Procedure: Total laparoscopic hysterectomy, bilateral salpingo-oophorectomy, cystoscopy (Bilateral) Diagnosis:       Endometrial hyperplasia without atypia, simple      (Endometrial hyperplasia without atypia, simple [N85.01])    Surgeons: Sarah Rodriguez MD Responsible Provider: Tal Driscoll MD    Anesthesia Type: general ASA Status: 3            Anesthesia Type: general    Vitals Value Taken Time   /65 06/16/25 15:02   Temp 36.0 06/16/25 15:02   Pulse 65 06/16/25 15:02   Resp 14 06/16/25 15:02   SpO2 99 06/16/25 15:02       Anesthesia Post Evaluation    Patient location during evaluation: PACU  Patient participation: complete - patient participated  Level of consciousness: sleepy but conscious  Pain management: adequate  Multimodal analgesia pain management approach  Airway patency: patent  Two or more strategies used to mitigate risk of obstructive sleep apnea  Cardiovascular status: acceptable and blood pressure returned to baseline  Respiratory status: acceptable  Hydration status: acceptable  Postoperative Nausea and Vomiting: none        There were no known notable events for this encounter.

## 2025-06-16 NOTE — OP NOTE
Date: 2025  OR Location: [facility] OR    Name: Eulalia Oleary, : 1975, Age: 50 y.o., MRN: [id number], Sex: female    Diagnosis  Pre-op Diagnosis      * Endometrial hyperplasia without atypia, simple [N85.01] Post-op Diagnosis     * Endometrial hyperplasia without atypia, simple [N85.01]     Procedures  Total laparoscopic hysterectomy, bilateral salpingo-oophorectomy, cystoscopy  67061 - SC LAPAROSCOPY TOTAL HYSTERECTOMY UTERUS >250 GM      Surgeons      * Sarha Rodriguez - Primary    Resident/Fellow/Other Assistant:  Surgeons and Role:     * Augusta Sears MD - Resident - Assisting     * Augusta Nur MD - Resident - Assisting    Staff:   Circulator: Peggy Pizano Person: Miri Vaca Scrub: Johnny  Circulator: Melony Vaca Circulator: Rina    Anesthesia Staff: Anesthesiologist: Tal Driscoll MD  Anesthesia Resident: Shilpi Snow DO    Procedure Summary  Anesthesia: General  ASA: III  Estimated Blood Loss: 20mL  Intra-op Medications: * Intraprocedure medication information is unavailable because the case start and end events have not been set *           Anesthesia Record               Intraprocedure I/O Totals          Intake    Ketamine 5.00 mL    The total shown is the total volume documented since Anesthesia Start was filed.    LR bolus 1000.00 mL    metroNIDAZOLE (Flagyl) 500 mg in sodium chloride (iso)  mL 100.00 mL    Total Intake 1105 mL       Output    Urine 210 mL    Total Blood Loss - Surgical Delivery (mL) 50 mL    Total Output 260 mL       Net    Net Volume 845 mL       Other (could not be determined as input or output)    Surgical Delivery Estimated Blood Loss (mL) 50          Specimen:   ID Type Source Tests Collected by Time   1 : uterus, cervix, bilateral tubes and ovaries Tissue UTERUS, CERVIX, FALLOPIAN TUBES AND OVARIES BILATERAL SURGICAL PATHOLOGY EXAM Sarah Rodriguez MD 2025 1406         Procedure Details:  The patient was seen in the  preoperative area. The site of surgery was properly noted/marked if necessary per policy. The patient has been actively warmed in preoperative area. Preoperative antibiotics have been ordered and given within 1 hours of incision. Venous thrombosis prophylaxis have been ordered including bilateral sequential compression devices and chemical prophylaxis    Findings: filmy adhesions between sigmoid colon and left pelvic side wall, normal appearing uterus, cervix and fallopian tubes.      After informed consent was obtained, the patient was taken to the operating room. Heparin was administered and SCDs were placed and turned on. General anesthesia was administered. She was then positioned in the dorsal lithotomy position with her arms carefully tucked. She was prepped and draped in the usual sterile fashion. A Saavedra catheter was placed. A bivalve speculum was placed in the vagina, and the cervix was visualized. A [email or web address] system was then placed as a uterine manipulator. The speculum was then removed.    We then turned our attention to the laparoscopic portion of the operation. A 12mm supraumbilical vertical incision was made through the skin. This was carried down to the level of the fascia with two S retractors. The fascia was elevated with 2 kocher clamps and incised with a scalpel.  Both sides of the fascia were then tagged with 0-vicryl suture. The princess port was then placed, and entry into the peritoneal cavity was confirmed with a 10mm scope. Pneumoperitoneum was then established. No trauma at site of entry was noted, and an upper abdominal survey was unremarkable. The patient was placed in deep Trendelenburg. Three 5-mm accessory ports were placed under direct visualization. The small bowel was then manipulated out of the pelvis.     Adhesions of the left pelvic sidewall to the sigmoid colon were noted, and taken down with monopolar scissors. We then proceeded with the hysterectomy. The left pelvic sidewall was  opened with monopolar scissors and the left ureter was dissected well away from the left IP liagment and he retroperitoneal space was developed parallel and lateral to the IP ligament   The left IP ligament was then skeletonized, coagulated, and ligated. The left round ligament was coagulated and incised. The left pelvic peritoneum was dissected caudally. The posterior peritoneum was then incised taking care to note the location of the ureter. The peritoneal dissection was then carried anteriorly, and the bladder was carefully dissected off the uterus and cervix. Attention was then turned to the patient's right side.  In a similar fashion, the uterus was then placed on counter tension. The left round ligament was coagulated and incised, and the retroperitoneal space was developed. The right IP was skeletenized, cauterized and ligated.  The right ureter was identified. The posterior peritoneum was incised, taking care to note the location of the ureter.     Attention again was turned anteriorly, ensuring that the bladder was well away from the uterus and cervix. The uterine vessels were then skeletonized bilaterally. They were then coagulated and ligated utilizing the LigaSure device. The LigaSure device was then used to take straight bites down the length of the cervix until we were at the level of the internal os utilizing the V Care manipulator's cup as a geographic land charito. A circumferential colpotomy was then performed with cautery, and the uterus, cervix, fallopian tubes, and ovaries were delivered through the patient's vagina and sent for permanent fixation. At this point, copious irrigation of the abdomen was performed and hemostasis was noted.    The colpotomy was closed from above using a running suture of #0 V-lock suture.  Hemostasis was achieved. Floseal was placed over the cuff. And in pelvic side wall. The abdomen was again copiously irrigated. Hemostasis was again noted. The 5mm ports were all  removed. The 12mm port was removed and the fascia was then closed with another figure-of-eight suture of 0-Vicryl. All port sites were closed using 4-0 Monocryl in a subcuticular fashion. Steristrips were applied. The jack catheter was removed.     Attention was then turned to the vulva. A 30° cystoscope was inserted per urethral meatus and advanced, under direct vision, into the urinary bladder. The bladder was distended with isotonic saline. The bladder capacity was normal, and the bladder wall was noted to expand symmetrically in all dimensions with normal appearing mucosa. Both ureteral orifices were in the normal anatomic position with clear urinary efflux bilaterally. The bladder was emptied and the cystoscope removed under direct vision. Rectal exam was preformed and was normal.     Sponge, needle, instrument counts were correct x 2. Dr. Rodriguez was present for the entirety of the procedure. The patient tolerated the procedure well and was returned to the PACU in stable condition.    Menopause Status: This patient is post-menopausal.     Complications:  None; patient tolerated the procedure well.     Disposition: PACU - hemodynamically stable.  Condition: stable

## 2025-06-16 NOTE — ANESTHESIA PROCEDURE NOTES
Airway  Date/Time: 6/16/2025 12:37 PM  Reason: elective    Airway not difficult    Staffing  Performed: resident   Authorized by: Tal Driscoll MD    Performed by: Shilpi Snow DO  Patient location during procedure: OR    Patient Condition  Indications for airway management: anesthesia  Patient position: sniffing  Planned trial extubation  Sedation level: deep     Final Airway Details   Preoxygenated: yes  Final airway type: endotracheal airway  Successful airway: ETT  Cuffed: yes   Successful intubation technique: video laryngoscopy  Adjuncts used in placement: intubating stylet and cricoid pressure  Endotracheal tube insertion site: oral  Blade: Vasquez  Blade size: #3  ETT size (mm): 7.0  Cormack-Lehane Classification: grade IIa - partial view of glottis  Placement verified by: capnometry   Measured from: lips  ETT to lips (cm): 22  Number of attempts at approach: 1    Additional Comments  Easy mask no oral airway used  Grade 2a view of cords with Murillo video MAC 3 blade  Size 7.0 ETT placed and secured 22 cm at lips with 1 attempt

## 2025-06-16 NOTE — ANESTHESIA PREPROCEDURE EVALUATION
Patient: Eulalia Oleary    Procedure Information       Date/Time: 06/16/25 1150    Procedure: Total laparoscopic hysterectomy, bilateral salpingo-oophorectomy, any indicated procedure (Bilateral)    Location: [facility] OR  / [facility] OR    Surgeons: Sarah Rodriguez MD            Relevant Problems   Anesthesia (within normal limits)      Cardiac   (+) HTN (hypertension)      Pulmonary   (+) Asthma exacerbation (HHS-HCC)      Neuro   (+) Cervical radiculopathy, chronic   (+) Radiculopathy, cervical region      /Renal   (+) Nephrolithiasis      Liver (within normal limits)      Endocrine   (+) Obesity      Hematology (within normal limits)      Musculoskeletal   (+) Decreased ROM of neck      GYN   (+) Dysfunctional uterine bleeding   (+) Menorrhagia with irregular cycle      Nervous   (+) Dysmenorrhea      Genitourinary   (+) Endometrial hyperplasia without atypia, simple       Clinical information reviewed:   Tobacco  Allergies  Meds   Med Hx  Surg Hx   Fam Hx  Soc Hx        NPO Detail:  NPO/Void Status  Date of Last Liquid: 06/16/25  Time of Last Liquid: 0700  Date of Last Solid: 06/15/25  Time of Last Solid: 1800  Last Intake Type: Clear fluids  Time of Last Void: 1110         Vitals:    06/16/25 1105   BP: 117/56   Pulse: 89   Resp: 16   Temp: 36.7 °C (98.1 °F)   SpO2: 97%        Chemistry    Lab Results   Component Value Date/Time     06/06/2025 1112    K 4.2 06/06/2025 1112     06/06/2025 1112    CO2 25 06/06/2025 1112    BUN 14 06/06/2025 1112    CREATININE 0.64 06/06/2025 1112    Lab Results   Component Value Date/Time    CALCIUM 8.9 06/06/2025 1112          Lab Results   Component Value Date/Time    WBC 9.6 06/06/2025 1112    HGB 11.6 (L) 06/06/2025 1112    HCT 36.6 06/06/2025 1112     06/06/2025 1112       Physical Exam    Airway  Mallampati: II     Cardiovascular   Rhythm: regular  Rate: normal     Dental - normal exam     Pulmonary Breath sounds clear to  auscultation     Abdominal (+) obese             Anesthesia Plan    History of general anesthesia?: yes  History of complications of general anesthesia?: no    ASA 3     general     intravenous induction   Postoperative pain plan includes opioids.  Trial extubation is planned.  Anesthetic plan and risks discussed with patient.  Use of blood products discussed with patient who consented to blood products.    Plan discussed with resident and attending.

## 2025-06-16 NOTE — ANESTHESIA PROCEDURE NOTES
Peripheral IV  Date/Time: 6/16/2025 12:39 PM      Placement  Needle size: 18 G  Laterality: left  Location: hand  Local anesthetic: none  Site prep: chlorhexidine  Technique: anatomical landmarks  Attempts: 1         negative Normal S1, S2/Regular rate and variability/No murmur/Symmetric upper and lower extremity pulses of normal amplitude

## 2025-06-16 NOTE — CARE PLAN
Problem: Pain - Adult  Goal: Verbalizes/displays adequate comfort level or baseline comfort level  Outcome: Progressing     Problem: Safety - Adult  Goal: Free from fall injury  Outcome: Progressing     Problem: Discharge Planning  Goal: Discharge to home or other facility with appropriate resources  Outcome: Progressing     Problem: Chronic Conditions and Co-morbidities  Goal: Patient's chronic conditions and co-morbidity symptoms are monitored and maintained or improved  Outcome: Progressing   The patient's goals for the shift include rest after surgery    The clinical goals for the shift include adequate pain control  Patients pain has been adequately controlled during shift.

## 2025-06-17 ENCOUNTER — PHARMACY VISIT (OUTPATIENT)
Dept: PHARMACY | Facility: CLINIC | Age: 50
End: 2025-06-17
Payer: COMMERCIAL

## 2025-06-17 LAB
ANION GAP SERPL CALC-SCNC: 15 MMOL/L (ref 10–20)
ANION GAP SERPL CALC-SCNC: 17 MMOL/L (ref 10–20)
BUN SERPL-MCNC: 21 MG/DL (ref 6–23)
BUN SERPL-MCNC: 24 MG/DL (ref 6–23)
CALCIUM SERPL-MCNC: 8 MG/DL (ref 8.6–10.6)
CALCIUM SERPL-MCNC: 8.4 MG/DL (ref 8.6–10.6)
CHLORIDE SERPL-SCNC: 93 MMOL/L (ref 98–107)
CHLORIDE SERPL-SCNC: 93 MMOL/L (ref 98–107)
CO2 SERPL-SCNC: 21 MMOL/L (ref 21–32)
CO2 SERPL-SCNC: 21 MMOL/L (ref 21–32)
CREAT SERPL-MCNC: 1.05 MG/DL (ref 0.5–1.05)
CREAT SERPL-MCNC: 1.06 MG/DL (ref 0.5–1.05)
EGFRCR SERPLBLD CKD-EPI 2021: 63 ML/MIN/1.73M*2
EGFRCR SERPLBLD CKD-EPI 2021: 64 ML/MIN/1.73M*2
ERYTHROCYTE [DISTWIDTH] IN BLOOD BY AUTOMATED COUNT: 14.2 % (ref 11.5–14.5)
GLUCOSE SERPL-MCNC: 118 MG/DL (ref 74–99)
GLUCOSE SERPL-MCNC: 143 MG/DL (ref 74–99)
HCT VFR BLD AUTO: 32.6 % (ref 36–46)
HGB BLD-MCNC: 10.4 G/DL (ref 12–16)
MCH RBC QN AUTO: 29.5 PG (ref 26–34)
MCHC RBC AUTO-ENTMCNC: 31.9 G/DL (ref 32–36)
MCV RBC AUTO: 92 FL (ref 80–100)
NRBC BLD-RTO: 0 /100 WBCS (ref 0–0)
PLATELET # BLD AUTO: 267 X10*3/UL (ref 150–450)
POTASSIUM SERPL-SCNC: 3.5 MMOL/L (ref 3.5–5.3)
POTASSIUM SERPL-SCNC: 3.7 MMOL/L (ref 3.5–5.3)
RBC # BLD AUTO: 3.53 X10*6/UL (ref 4–5.2)
SODIUM SERPL-SCNC: 125 MMOL/L (ref 136–145)
SODIUM SERPL-SCNC: 127 MMOL/L (ref 136–145)
WBC # BLD AUTO: 15.4 X10*3/UL (ref 4.4–11.3)

## 2025-06-17 PROCEDURE — 85027 COMPLETE CBC AUTOMATED: CPT | Performed by: STUDENT IN AN ORGANIZED HEALTH CARE EDUCATION/TRAINING PROGRAM

## 2025-06-17 PROCEDURE — RXMED WILLOW AMBULATORY MEDICATION CHARGE

## 2025-06-17 PROCEDURE — 2500000004 HC RX 250 GENERAL PHARMACY W/ HCPCS (ALT 636 FOR OP/ED): Performed by: STUDENT IN AN ORGANIZED HEALTH CARE EDUCATION/TRAINING PROGRAM

## 2025-06-17 PROCEDURE — 7100000011 HC EXTENDED STAY RECOVERY HOURLY - NURSING UNIT

## 2025-06-17 PROCEDURE — 80048 BASIC METABOLIC PNL TOTAL CA: CPT

## 2025-06-17 PROCEDURE — 99231 SBSQ HOSP IP/OBS SF/LOW 25: CPT | Performed by: STUDENT IN AN ORGANIZED HEALTH CARE EDUCATION/TRAINING PROGRAM

## 2025-06-17 PROCEDURE — 97116 GAIT TRAINING THERAPY: CPT | Mod: GP

## 2025-06-17 PROCEDURE — 36415 COLL VENOUS BLD VENIPUNCTURE: CPT | Performed by: STUDENT IN AN ORGANIZED HEALTH CARE EDUCATION/TRAINING PROGRAM

## 2025-06-17 PROCEDURE — 80048 BASIC METABOLIC PNL TOTAL CA: CPT | Performed by: STUDENT IN AN ORGANIZED HEALTH CARE EDUCATION/TRAINING PROGRAM

## 2025-06-17 PROCEDURE — 2500000004 HC RX 250 GENERAL PHARMACY W/ HCPCS (ALT 636 FOR OP/ED)

## 2025-06-17 PROCEDURE — 97161 PT EVAL LOW COMPLEX 20 MIN: CPT | Mod: GP

## 2025-06-17 PROCEDURE — 36415 COLL VENOUS BLD VENIPUNCTURE: CPT

## 2025-06-17 PROCEDURE — 2500000001 HC RX 250 WO HCPCS SELF ADMINISTERED DRUGS (ALT 637 FOR MEDICARE OP): Performed by: STUDENT IN AN ORGANIZED HEALTH CARE EDUCATION/TRAINING PROGRAM

## 2025-06-17 PROCEDURE — 96372 THER/PROPH/DIAG INJ SC/IM: CPT | Performed by: STUDENT IN AN ORGANIZED HEALTH CARE EDUCATION/TRAINING PROGRAM

## 2025-06-17 RX ORDER — POLYETHYLENE GLYCOL 3350 17 G/17G
17 POWDER, FOR SOLUTION ORAL DAILY PRN
Qty: 238 G | Refills: 0 | Status: SHIPPED | OUTPATIENT
Start: 2025-06-17

## 2025-06-17 RX ORDER — ACETAMINOPHEN 325 MG/1
975 TABLET ORAL EVERY 6 HOURS PRN
Qty: 50 TABLET | Refills: 0 | Status: SHIPPED | OUTPATIENT
Start: 2025-06-17

## 2025-06-17 RX ORDER — SODIUM CHLORIDE 9 MG/ML
100 INJECTION, SOLUTION INTRAVENOUS CONTINUOUS
Status: DISCONTINUED | OUTPATIENT
Start: 2025-06-17 | End: 2025-06-18 | Stop reason: HOSPADM

## 2025-06-17 RX ORDER — ONDANSETRON 4 MG/1
4 TABLET, FILM COATED ORAL EVERY 6 HOURS PRN
Qty: 9 TABLET | Refills: 0 | Status: SHIPPED | OUTPATIENT
Start: 2025-06-17

## 2025-06-17 RX ORDER — TRAMADOL HYDROCHLORIDE 50 MG/1
50 TABLET, FILM COATED ORAL EVERY 6 HOURS PRN
Qty: 12 EACH | Refills: 0 | Status: SHIPPED | OUTPATIENT
Start: 2025-06-17

## 2025-06-17 RX ORDER — IBUPROFEN 600 MG/1
600 TABLET, FILM COATED ORAL EVERY 6 HOURS PRN
Qty: 50 TABLET | Refills: 0 | Status: SHIPPED | OUTPATIENT
Start: 2025-06-17

## 2025-06-17 RX ADMIN — KETOROLAC TROMETHAMINE 15 MG: 15 INJECTION, SOLUTION INTRAMUSCULAR; INTRAVENOUS at 08:17

## 2025-06-17 RX ADMIN — ACETAMINOPHEN 975 MG: 325 TABLET ORAL at 11:33

## 2025-06-17 RX ADMIN — FAMOTIDINE 20 MG: 20 TABLET, FILM COATED ORAL at 08:17

## 2025-06-17 RX ADMIN — ACETAMINOPHEN 975 MG: 325 TABLET ORAL at 17:02

## 2025-06-17 RX ADMIN — IBUPROFEN 600 MG: 600 TABLET ORAL at 20:17

## 2025-06-17 RX ADMIN — KETOROLAC TROMETHAMINE 15 MG: 15 INJECTION, SOLUTION INTRAMUSCULAR; INTRAVENOUS at 14:17

## 2025-06-17 RX ADMIN — MONTELUKAST 10 MG: 10 TABLET, FILM COATED ORAL at 20:18

## 2025-06-17 RX ADMIN — POLYETHYLENE GLYCOL 3350 17 G: 17 POWDER, FOR SOLUTION ORAL at 08:18

## 2025-06-17 RX ADMIN — SIMETHICONE 80 MG: 80 TABLET, CHEWABLE ORAL at 08:17

## 2025-06-17 RX ADMIN — ACETAMINOPHEN 975 MG: 325 TABLET ORAL at 23:51

## 2025-06-17 RX ADMIN — SODIUM CHLORIDE 500 ML: 0.9 INJECTION, SOLUTION INTRAVENOUS at 11:33

## 2025-06-17 RX ADMIN — ACETAMINOPHEN 975 MG: 325 TABLET ORAL at 05:24

## 2025-06-17 RX ADMIN — SODIUM CHLORIDE 500 ML: 0.9 INJECTION, SOLUTION INTRAVENOUS at 15:34

## 2025-06-17 RX ADMIN — ENOXAPARIN SODIUM 60 MG: 100 INJECTION SUBCUTANEOUS at 20:17

## 2025-06-17 RX ADMIN — FAMOTIDINE 20 MG: 20 TABLET, FILM COATED ORAL at 20:17

## 2025-06-17 RX ADMIN — SODIUM CHLORIDE 100 ML/HR: 0.9 INJECTION, SOLUTION INTRAVENOUS at 17:50

## 2025-06-17 RX ADMIN — SODIUM CHLORIDE 100 ML/HR: 0.9 INJECTION, SOLUTION INTRAVENOUS at 16:57

## 2025-06-17 RX ADMIN — KETOROLAC TROMETHAMINE 15 MG: 15 INJECTION, SOLUTION INTRAMUSCULAR; INTRAVENOUS at 02:00

## 2025-06-17 RX ADMIN — ENOXAPARIN SODIUM 60 MG: 100 INJECTION SUBCUTANEOUS at 08:53

## 2025-06-17 RX ADMIN — MAGNESIUM OXIDE TAB 400 MG (241.3 MG ELEMENTAL MG) 1 TABLET: 400 (241.3 MG) TAB at 08:17

## 2025-06-17 ASSESSMENT — PAIN SCALES - GENERAL
PAINLEVEL_OUTOF10: 2
PAINLEVEL_OUTOF10: 3
PAINLEVEL_OUTOF10: 4
PAINLEVEL_OUTOF10: 3
PAINLEVEL_OUTOF10: 2

## 2025-06-17 ASSESSMENT — COGNITIVE AND FUNCTIONAL STATUS - GENERAL
TURNING FROM BACK TO SIDE WHILE IN FLAT BAD: A LITTLE
MOBILITY SCORE: 21
MOVING FROM LYING ON BACK TO SITTING ON SIDE OF FLAT BED WITH BEDRAILS: A LITTLE
CLIMB 3 TO 5 STEPS WITH RAILING: A LITTLE

## 2025-06-17 ASSESSMENT — PAIN - FUNCTIONAL ASSESSMENT
PAIN_FUNCTIONAL_ASSESSMENT: 0-10
PAIN_FUNCTIONAL_ASSESSMENT: 0-10

## 2025-06-17 ASSESSMENT — PAIN DESCRIPTION - DESCRIPTORS
DESCRIPTORS: SORE;CRAMPING
DESCRIPTORS: SORE

## 2025-06-17 ASSESSMENT — PAIN DESCRIPTION - ORIENTATION
ORIENTATION: LOWER
ORIENTATION: LOWER

## 2025-06-17 ASSESSMENT — ACTIVITIES OF DAILY LIVING (ADL): ADL_ASSISTANCE: INDEPENDENT

## 2025-06-17 ASSESSMENT — PAIN DESCRIPTION - LOCATION
LOCATION: ABDOMEN
LOCATION: ABDOMEN

## 2025-06-17 NOTE — CARE PLAN
The patient's goals for the shift include rest    The clinical goals for the shift include for patient to have adequate pain management and remain free of post-op infection s/sx    Over the shift, the patient did make progress toward the following goals. Overnight, VS remained stable. Patient recently reported pain being 2/10, and pain has been managed with tylenol, toradol, and oxy 5 per orders. Patient has had scant spotting on pads overnight. Patient denies any N/V. Patient has been belching and voiding in hat. Patient is ambulating on her own. And is currently stable, resting in room, and denies needing anything at this time.    Problem: Pain - Adult  Goal: Verbalizes/displays adequate comfort level or baseline comfort level  Outcome: Progressing     Problem: Safety - Adult  Goal: Free from fall injury  Outcome: Progressing     Problem: Chronic Conditions and Co-morbidities  Goal: Patient's chronic conditions and co-morbidity symptoms are monitored and maintained or improved  Outcome: Progressing

## 2025-06-17 NOTE — SIGNIFICANT EVENT
"Gynecologic Progress Note     Eulalia Oleary is a 50 y.o. female now POD 0 from [facility], BSO cysto for EIN.    Subjective   Patient resting in bed, reports pain is 3/10, well controlled, has ambulated, voided freely, tolerated oral intake.    Objective     Physical Exam  Blood pressure 105/50, pulse 75, temperature 36.5 °C (97.7 °F), temperature source Temporal, resp. rate 18, height 1.626 m (5' 4\"), weight 132 kg (291 lb 10.7 oz), SpO2 (!) 95%.    General: no acute distress  HEENT: normocephalic, atraumatic  Heart: warm and well perfused, RRR  Lungs: no increased WOB, CTAB  Abd: soft,  obese, port sites covered by mini islands with no shadowing.  Extremities: moving all extremities  Neuro: awake and conversant  Psych: appropriate mood and affect       Lab Results   Component Value Date    WBC 9.6 06/06/2025    HGB 11.6 (L) 06/06/2025    HCT 36.6 06/06/2025    MCV 91 06/06/2025     06/06/2025     Lab Results   Component Value Date    GLUCOSE 98 06/06/2025    CALCIUM 8.9 06/06/2025     06/06/2025    K 4.2 06/06/2025    CO2 25 06/06/2025     06/06/2025    BUN 14 06/06/2025    CREATININE 0.64 06/06/2025       Assessment/Plan      Eulalia Oleary is a 50 y.o. female now POD  0 from [facility], BSO cysto for EIN.    Postop Care  - tylenol, toradol, tramadol ordered with dilaudid PRN ordered for breakthrough  - hemodynamically stable  - Hgb 11.6 --> procedure EBL 20 ml -> will check CBC POD#1  - stable on RA  - encouraged incentive spirometry 10x/hr while awake  - regular diet  - IVF @ 40cc/hr until tolerating adequate PO hydration  - IV zofran PRN for nausea  - simethicone PRN for gas  - UOP adequate  - maintain jack for 2-3 days, plan for active void trial   - SCDs in place, encourage early ambulation  - heparin ppx, will transition to lovenox on POD#1 pending AM labs    Comorbidities  - HTN: on lisinopril hydrochlorothiazide 20-25, HELD   - Asthma: monteleukast/symbicort/albuterol, cont  - IBS: levsin cont "     Dispo: inpatient until meeting postoperative milestones     To be staffed in AM  Cammy Phillip MD  PGY2, Gynecologic Oncology

## 2025-06-17 NOTE — DISCHARGE INSTRUCTIONS
Laparoscopic Hysterectomy Discharge Instructions  If you have any questions about your care, please contact us at [phone].     Medications and Pain Management  Common areas of pain after laparoscopic hysterectomy include the incision pain, pain in between your shoulder blades, the pelvis and lower back. The gas that was used to distend your abdomen for the surgery is absorbed slowly into your blood stream over the first 3-4 days after surgery. It is not passed intestinally, although, because your abdomen is distended, it may feel similar to intestinal gas. Staying active and walking is the best way to promote the absorption of this gas.    Immediately after surgery, nerve pain is the most intense, typically for the first 6 to 12 hours. As the body heals, it creates inflammation around the incisions sites adding pressure and creating soreness. After 5 days, the inflammation begins to recede and significant improvement in soreness is expected. Pulling on the incisions, especially if sudden, such as when you cough, will reactivate the nerve pain. Support your abdomen with a pillow during coughing or sneezing as this will be helpful to minimize pain. There are two types of pain pills typically used for post-operative pain management, narcotics such as tramadol and an anti-inflammatory such as Ibuprofen or Naprosyn.    Taking regular anti-inflammatory pills, such as 600mg of Ibuprofen every 6 hours for the first 5 days and then as needed is recommended. You can alternate ibuprofen with tylenol (975mg or 1000mg). The tylenol can be taken every 6 hours.  If you have problems using NSAIDs, be sure to discuss this with your doctor. The narcotic can be used on a schedule for the first 1 to 2 days but after that, only as you need it. Narcotics can cause constipation, nausea, sleepiness and headaches. You may begin your usual home medications as you were taking before unless directed by your doctor.    Incisional  care  Paper tape steri-strips are typically used for the abdominal incisions. The steri-strips will fall off on their own or can be removed at your first post-operative appointment. You may shower and use a mild soap around the incisions and pat dry. Do not use a washcloth or scrub the incisions. Using peroxide or antiseptics is not recommended for routine care. Avoid hot and steamy showers as this may cause you to feel faint. No tub baths for six weeks following surgery. There may be discoloration or bruising around the incisions. This is normal and may take several weeks to resolve. Firmness or a nodular area under the skin near the incision may represent a collection of blood, this too will resolve on its own after a little time. If any incision develops tenderness, redness in the skin layer or has drainage please call the office.    Vaginal Discharge  You may have a mildly malodorous discharge and occasional spotting for up to 6 weeks. Do not put anything in the vagina like tampons or have sexual intercourse for 6 weeks after surgery. If you are having bleeding like a period, that is abnormal and you should contact your doctor.    GI Function, Nausea and Constipation  Nausea can occasionally be an issue in the first few days after surgery. It is usually caused as a side effect from the anesthesia and pain medicine, particularly narcotics. Taking the pain pills with food is a food way to proactively minimize this. Throwing up, especially after the first day, is not expected and if this happens, you should call your doctor. Feeling gassy and constipation can be a problem for the first week after surgery. Limiting the use of narcotics may be helpful. Stool softeners twice a day and a high fiber diet are safe. If needed, Miralax once daily is a good choice. If no bowel movement after 3 days, you will need to increase the Miralax until soft, regular bowel movements are passing.    Urinating  Because the bladder is  disturbed by the surgery, the normal sensation may be temporarily altered. You may not be aware that your bladder is full. If the bladder is allowed to get over distended, it may make the problem worse. This is why we make sure that you are able to empty your bladder adequately before you go home. For the first few days at home, you should make a point to empty your bladder every 3 to 4 hours. Pain with voiding, especially after the first day, is not expected and may represent a bladder infection.    Activity  For the first two days post-operatively, your soreness and recovery from anesthesia will limit your activity  Stairs are safe, just take your time  At a minimum during this time, you should walk around for 10-15 minutes every 2-3 hours. After that, in the first week, any activity except for overt exercise is safe.   During the first week you should not commit to being on your feet for more than 30 minutes at a time.   During the second week, light exercise is encouraged.  After 2 weeks from surgery, you should try to get back into regular activity other than heavy lifting.   For healing, please limit the amount of weight lifted to 8-10 pounds (a gallon of milk) for the first 6 weeks after surgery.   Driving is usually okay after the first few days. You must be able to comfortably wear a seatbelt, press the gas/brake pedals, and drive defensively. You may not drive while taking narcotic pain medicines.    When to Call the Doctor  Call for any fever above 100.4 F (If you do not feel feverish you do not have to routinely check your temperature.)  Call for severe pain not improved by medications  Call for persistent nausea, vomiting  Call for vaginal bleeding that is heavy as a period or passing blood clots larger than a quarter  Call for unusual swelling in your legs  Call if the incisions develop painful redness and discharge    In an emergency, call 911 or go to an Emergency Department at a nearby hospital

## 2025-06-17 NOTE — PROGRESS NOTES
Gynecology Progress Note    Subjective   Pt reports feeling well this AM. Reports her abdominal pain is 3/10. Pt was able to ambulate without assistance to bathroom and back. Pt was able to eat part of her dinner last night. Some nausea yesterday, none this AM. Pt has voided, has not passed gas or had BM. Pt state she would feel comfortable going home today if possible.    Objective   Last Vitals:  Temp Pulse Resp BP MAP Pulse Ox   36.5 °C (97.7 °F) 81 20 95/55 68 96 %     Vitals Min/Max Last 24 Hours:  Temp  Min: 36 °C (96.8 °F)  Max: 36.9 °C (98.4 °F)  Pulse  Min: 57  Max: 89  Resp  Min: 11  Max: 20  BP  Min: 95/55  Max: 147/92  MAP (mmHg)  Min: 68  Max: 103    Intake/Output:    Intake/Output Summary (Last 24 hours) at 6/17/2025 0654  Last data filed at 6/17/2025 0635  Gross per 24 hour   Intake 1731.6 ml   Output 1110 ml   Net 621.6 ml       Physical Examination:  General: no acute distress  HEENT: normocephalic, atraumatic  Heart: warm and well perfused  Lungs: no increased WOB  Abd: soft,  obese, port sites covered by mini islands with mild serosang staining beneath.  Extremities: moving all extremities  Neuro: awake and conversant  Psych: appropriate mood and affect     Lab Review:  Lab Results   Component Value Date    WBC 15.4 (H) 06/17/2025    HGB 10.4 (L) 06/17/2025    HCT 32.6 (L) 06/17/2025     06/17/2025     Lab Results   Component Value Date    GLUCOSE 118 (H) 06/17/2025     (L) 06/17/2025    K 3.7 06/17/2025    CL 93 (L) 06/17/2025    CO2 21 06/17/2025    ANIONGAP 17 06/17/2025    BUN 24 (H) 06/17/2025    CREATININE 1.06 (H) 06/17/2025    EGFR 63 06/17/2025    CALCIUM 8.4 (L) 06/17/2025       Assessment/Plan   Eulalia Oleary is a 50 y.o. female now POD 0 from [facility], BSO cysto for EIN.     POD1 TLH/BSO   -Pt recovering well on POD1 from GYN perspective, PT evaluated pt today and had no recommendations or barriers for DC.  - tylenol, toradol, tramadol ordered with dilaudid PRN ordered for  breakthrough  - hemodynamically stable, stable on RA  - Hgb 11.6 --> procedure EBL 20 ml -> 10.4 POD1  - encouraged incentive spirometry 10x/hr while awake  - regular diet  - IVF @ 40cc/hr until tolerating adequate PO hydration  - IV zofran PRN for nausea  - simethicone PRN for gas  - UOP adequate  - SCDs in place, encourage early ambulation  - lovenox ppx    SUZIE  -Pre procedure (6/6) Cr 0.64 and Na 136 -> POD1 Cr 1.06 and Na 127  -given BUN:Cr ratio>20, likely prerenal   -given SUZIE, plan to keep pt overnight. Discussed plan w pt and she was agreeable to stay.   -2x 500cc fluid boluses and started on maintenance NS 100ml/hr    Comorbidities  - HTN: on lisinopril hydrochlorothiazide 20-25, HELD   - Asthma: monteleukast/symbicort/albuterol, cont  - IBS: levsin cont      Dispo: inpatient until meeting postoperative milestones     Pt and plan to be discussed with Dr. Harry Smith Ta, M4

## 2025-06-17 NOTE — PROGRESS NOTES
Physical Therapy    Physical Therapy Evaluation & Treatment    Patient Name: Eulalia Oleary  MRN: [id number]  Department: [facility]  Room: [address] [place] [address]  Today's Date: 6/17/2025   Time Calculation  Start Time: 0858  Stop Time: 0926  Time Calculation (min): 28 min    Assessment/Plan   PT Assessment  PT Assessment Results: Decreased endurance, Impaired balance  Rehab Prognosis: Excellent  Barriers to Discharge Home: No anticipated barriers  Strengths: Ability to acquire knowledge, Housing layout  End of Session Communication: Bedside nurse  Assessment Comment: Patient admitted for TLH, BSO. Patient presents with mild dec endurance and alterd balance which improved iwth use of RW. Recommend ongoing use of RW during recovery. Pt to benefit from PT while admitted.  End of Session Patient Position: Up in chair, Alarm off, not on at start of session   IP OR SWING BED PT PLAN  Inpatient or Swing Bed: Inpatient  PT Plan  Treatment/Interventions: Bed mobility, Transfer training, Gait training, Balance training, Endurance training  PT Plan: Ongoing PT  PT Frequency: 3 times per week  PT Discharge Recommendations: No PT needed after discharge  PT Recommended Transfer Status: Assistive device, Stand by assist  PT - OK to Discharge: Yes      Subjective     PT Visit Info:  PT Received On: 06/17/25  General Visit Information:  General  Reason for Referral: s/p TLH, BSO  Past Medical History Relevant to Rehab: HTN, patient reports previous spine surgeries with residual L leg  Prior to Session Communication: Bedside nurse  Patient Position Received: Bed, 3 rail up, Alarm off, not on at start of session  General Comment: Patient agreeable to evaluation. Patient c/o having mucus stuck in her throat  Home Living:  Home Living  Type of Home: House  Lives With:  (mother lives with her)  Home Adaptive Equipment: Walker rolling or standard, Cane (patient reporting has additional equipment available if needed leftover from her brother)  Home  Layout: One level  Home Access: Ramped entrance  Prior Level of Function:  Prior Function Per Pt/Caregiver Report  Level of [place]: Independent with ADLs and functional transfers, Independent with homemaking with ambulation  Receives Help From:  (reports sister will be supportive particularly to care for patients cats)  ADL Assistance: Independent  Homemaking Assistance: Independent  Ambulatory Assistance: Independent  Vocational: Full time employment  Prior Function Comments: drives  Precautions:  Precautions  Medical Precautions: Fall precautions, Abdominal precautions    Vitals:   post gait with cane Sp02 96%, HR 89  Post gait with RW Sp02 97%, HR 91       Objective   Pain:  Pain Assessment  0-10 (Numeric) Pain Score:  (patient reporting pain welll controlled)  Cognition:  Cognition  Overall Cognitive Status: Within Functional Limits    General Assessments:  Activity Tolerance  Endurance: Tolerates 10 - 20 min exercise with multiple rests    Coordination  Movements are Fluid and Coordinated: Yes    Static Sitting Balance  Static Sitting-Balance Support: No upper extremity supported, Feet supported  Static Sitting-Level of Assistance: Independent  Dynamic Sitting Balance  Dynamic Sitting-Balance Support: Feet supported, No upper extremity supported  Dynamic Sitting-Level of Assistance: Independent    Static Standing Balance  Static Standing-Balance Support: Bilateral upper extremity supported, Right upper extremity supported  Static Standing-Level of Assistance: Close supervision, Modified independent  Static Standing-Comment/Number of Minutes: close supervision with cane use, mod I with RW  Dynamic Standing Balance  Dynamic Standing-Balance Support: Bilateral upper extremity supported, Right upper extremity supported  Dynamic Standing-Level of Assistance: Modified independent, Close supervision  Dynamic Standing-Comments: close supervision for cane use, mod I wtih RW  Functional Assessments:  Bed  Mobility  Bed Mobility: Yes  Bed Mobility 1  Bed Mobility 1: Supine to sitting  Level of Assistance 1: Distant supervision  Bed Mobility Comments 1: cues for log roll    Transfers  Transfer: Yes  Transfer 1  Technique 1: Sit to stand, Stand to sit  Transfer Device 1: Cane  Transfer Level of Assistance 1: Close supervision  Trials/Comments 1: transfer from bed    Ambulation/Gait Training  Ambulation/Gait Training Performed: Yes  Ambulation/Gait Training 1  Device 1: Small base quad cane  Assistance 1: Close supervision  Quality of Gait 1: Wide base of support, Forward flexed posture (excessive truncal sway/heavy WS)  Comments/Distance (ft) 1: 30ft, pt with heavy breathing, c/o feeling like her asthma is acting up. Called RN to discuss with patient, decided will wait for inhaler to arrive from pharmacy, Sp02 96%  Extremity/Trunk Assessments:  RUE   RUE : Within Functional Limits  LUE   LUE: Within Functional Limits  RLE   RLE : Within Functional Limits  LLE   LLE : Within Functional Limits  Treatments:  Other Activity  Other Activity Performed: Yes  Other Activity 1: Gait training: patient completed additional gait training with use of RW. Patient completed 110ft of gait with improved truncal stabilty, improved observed WOB, and improved gait tolerance, MOD I. Patient completed transfer from chair without arm rests IND. Educated patient on walking program, progressing from inhouse to driveway to neighborhood and community. Advised using RW at DC and to self wean.  Outcome Measures:  [facility] Basic Mobility  Turning from your back to your side while in a flat bed without using bedrails: A little  Moving from lying on your back to sitting on the side of a flat bed without using bedrails: A little  Moving to and from bed to chair (including a wheelchair): None  Standing up from a chair using your arms (e.g. wheelchair or bedside chair): None  To walk in hospital room: None  Climbing 3-5 steps with railing: A little  Basic  Mobility - Total Score: 21    Encounter Problems       Encounter Problems (Active)       Balance       Score >26/28 on Tinetti to indicate low falls risk       Start:  06/17/25    Expected End:  07/01/25               Mobility       gait x500ft without device IND       Start:  06/17/25    Expected End:  07/01/25               Pain - Adult              Education Documentation  Home Exercise Program, taught by Lilliam Ordonez, PT at 6/17/2025 10:24 AM.  Learner: Patient  Readiness: Acceptance  Method: Explanation  Response: Verbalizes Understanding    Mobility Training, taught by Lilliam Ordonez, PT at 6/17/2025 10:24 AM.  Learner: Patient  Readiness: Acceptance  Method: Explanation  Response: Verbalizes Understanding    Education Comments  No comments found.

## 2025-06-17 NOTE — CARE PLAN
The patient's goals for the shift include adequate pain control    The clinical goals for the shift include adequate pain control    Over the shift, the patient did make progress toward the following goals. Patient maintained VS WNL, patient ambulated through the halls with PT, and is passing flatulence.

## 2025-06-18 VITALS
RESPIRATION RATE: 17 BRPM | DIASTOLIC BLOOD PRESSURE: 74 MMHG | HEART RATE: 74 BPM | SYSTOLIC BLOOD PRESSURE: 135 MMHG | BODY MASS INDEX: 49.79 KG/M2 | WEIGHT: 291.67 LBS | OXYGEN SATURATION: 98 % | TEMPERATURE: 97.3 F | HEIGHT: 64 IN

## 2025-06-18 LAB
ANION GAP SERPL CALC-SCNC: 10 MMOL/L (ref 10–20)
BUN SERPL-MCNC: 20 MG/DL (ref 6–23)
CALCIUM SERPL-MCNC: 8.1 MG/DL (ref 8.6–10.6)
CHLORIDE SERPL-SCNC: 100 MMOL/L (ref 98–107)
CO2 SERPL-SCNC: 24 MMOL/L (ref 21–32)
CREAT SERPL-MCNC: 0.71 MG/DL (ref 0.5–1.05)
EGFRCR SERPLBLD CKD-EPI 2021: >90 ML/MIN/1.73M*2
GLUCOSE SERPL-MCNC: 106 MG/DL (ref 74–99)
POTASSIUM SERPL-SCNC: 3.6 MMOL/L (ref 3.5–5.3)
SODIUM SERPL-SCNC: 130 MMOL/L (ref 136–145)

## 2025-06-18 PROCEDURE — 2500000004 HC RX 250 GENERAL PHARMACY W/ HCPCS (ALT 636 FOR OP/ED): Performed by: STUDENT IN AN ORGANIZED HEALTH CARE EDUCATION/TRAINING PROGRAM

## 2025-06-18 PROCEDURE — 2500000001 HC RX 250 WO HCPCS SELF ADMINISTERED DRUGS (ALT 637 FOR MEDICARE OP): Performed by: STUDENT IN AN ORGANIZED HEALTH CARE EDUCATION/TRAINING PROGRAM

## 2025-06-18 PROCEDURE — 2500000001 HC RX 250 WO HCPCS SELF ADMINISTERED DRUGS (ALT 637 FOR MEDICARE OP): Performed by: OBSTETRICS & GYNECOLOGY

## 2025-06-18 PROCEDURE — 7100000011 HC EXTENDED STAY RECOVERY HOURLY - NURSING UNIT

## 2025-06-18 PROCEDURE — 80048 BASIC METABOLIC PNL TOTAL CA: CPT

## 2025-06-18 PROCEDURE — 36415 COLL VENOUS BLD VENIPUNCTURE: CPT

## 2025-06-18 RX ORDER — CALCIUM CARBONATE 200(500)MG
1000 TABLET,CHEWABLE ORAL 4 TIMES DAILY PRN
Status: DISCONTINUED | OUTPATIENT
Start: 2025-06-18 | End: 2025-06-18 | Stop reason: HOSPADM

## 2025-06-18 RX ADMIN — POLYETHYLENE GLYCOL 3350 17 G: 17 POWDER, FOR SOLUTION ORAL at 08:20

## 2025-06-18 RX ADMIN — MAGNESIUM OXIDE TAB 400 MG (241.3 MG ELEMENTAL MG) 1 TABLET: 400 (241.3 MG) TAB at 08:20

## 2025-06-18 RX ADMIN — IBUPROFEN 600 MG: 600 TABLET ORAL at 02:30

## 2025-06-18 RX ADMIN — FAMOTIDINE 20 MG: 20 TABLET, FILM COATED ORAL at 08:20

## 2025-06-18 RX ADMIN — SIMETHICONE 80 MG: 80 TABLET, CHEWABLE ORAL at 08:20

## 2025-06-18 RX ADMIN — ACETAMINOPHEN 975 MG: 325 TABLET ORAL at 05:37

## 2025-06-18 RX ADMIN — FLUTICASONE FUROATE AND VILANTEROL TRIFENATATE 1 PUFF: 200; 25 POWDER RESPIRATORY (INHALATION) at 10:25

## 2025-06-18 RX ADMIN — ANTACID TABLETS 2 TABLET: 500 TABLET, CHEWABLE ORAL at 12:20

## 2025-06-18 RX ADMIN — ACETAMINOPHEN 975 MG: 325 TABLET ORAL at 11:31

## 2025-06-18 ASSESSMENT — PAIN DESCRIPTION - DESCRIPTORS: DESCRIPTORS: SORE

## 2025-06-18 ASSESSMENT — PAIN SCALES - GENERAL: PAINLEVEL_OUTOF10: 3

## 2025-06-18 NOTE — PROGRESS NOTES
Gynecology Progress Note    Subjective   Pt reports feeling well this AM. Reports pain well controlled on PO meds. Voiding freely, ambulating. Mentation appropriate.     Objective   Last Vitals:  Temp Pulse Resp BP MAP Pulse Ox   36.2 °C (97.2 °F) 76 17 125/76 89 96 %     Vitals Min/Max Last 24 Hours:  Temp  Min: 36 °C (96.8 °F)  Max: 37 °C (98.6 °F)  Pulse  Min: 63  Max: 76  Resp  Min: 17  Max: 21  BP  Min: 113/60  Max: 138/80  MAP (mmHg)  Min: 78  Max: 99    Intake/Output:    Intake/Output Summary (Last 24 hours) at 6/18/2025 0644  Last data filed at 6/17/2025 2250  Gross per 24 hour   Intake 4289.6 ml   Output 775 ml   Net 3514.6 ml       Physical Examination:  General: no acute distress  HEENT: normocephalic, atraumatic  Heart: warm and well perfused  Lungs: no increased WOB  Abd: soft, port sites covered by steri-strips with mild serosang staining beneath.  Extremities: moving all extremities  Neuro: awake and conversant  Psych: appropriate mood and affect     Lab Review:  Lab Results   Component Value Date    WBC 15.4 (H) 06/17/2025    HGB 10.4 (L) 06/17/2025    HCT 32.6 (L) 06/17/2025     06/17/2025     Lab Results   Component Value Date    GLUCOSE 118 (H) 06/17/2025     (L) 06/17/2025    K 3.7 06/17/2025    CL 93 (L) 06/17/2025    CO2 21 06/17/2025    ANIONGAP 17 06/17/2025    BUN 24 (H) 06/17/2025    CREATININE 1.06 (H) 06/17/2025    EGFR 63 06/17/2025    CALCIUM 8.4 (L) 06/17/2025       Assessment/Plan   Eulalia Oleary is a 50 y.o. female now post-operative from [facility], BSO cysto for EIN.     Post operative state  - Pain control per ERAS pathway  - hemodynamically stable, on RA  - Hgb 11.6 --> procedure EBL 20 ml -> 10.4 POD1  - encourage incentive spirometry 10x/hr while awake  - regular diet, IVF @ 40cc/hr until tolerating adequate PO hydration  - IV zofran PRN for nausea, simethicone PRN for gas  - UOP adequate  - SCDs in place, encourage early ambulation,  lovenox ppx  - PT evaluated pt  6/17 and had no recommendations or barriers for DC.    SUZIE  Hyponatremia  - Pre procedure (6/6) Cr 0.64 and Na 136 -> POD1 Cr 1.06 and Na 127  - given BUN:Cr ratio>20, likely prerenal   - given SUZIE, plan to keep pt overnight. Discussed plan w pt and she was agreeable to stay.   - 2x 500cc fluid boluses and started on maintenance NS 100ml/hr overnight.  - Repeat BMP pending this AM. If recovery, appropriate for dc today    Comorbidities  - HTN: on lisinopril hydrochlorothiazide 20-25, HELD   - Asthma: monteleukast/symbicort/albuterol, cont  - IBS: levsin cont      Dispo: discharge pending AM labs    Pt and plan to be discussed with Dr. Mars Nur MD   PGY-2, Benign Gynecology  Pager [phone]

## 2025-06-18 NOTE — CARE PLAN
Problem: Pain - Adult  Goal: Verbalizes/displays adequate comfort level or baseline comfort level  Outcome: Adequate for Discharge     Problem: Safety - Adult  Goal: Free from fall injury  Outcome: Adequate for Discharge     Problem: Discharge Planning  Goal: Discharge to home or other facility with appropriate resources  Outcome: Adequate for Discharge     Problem: Chronic Conditions and Co-morbidities  Goal: Patient's chronic conditions and co-morbidity symptoms are monitored and maintained or improved  Outcome: Adequate for Discharge     Problem: Nutrition  Goal: Nutrient intake appropriate for maintaining nutritional needs  Outcome: Adequate for Discharge     The patient's goals for the shift include adequate pain control    The clinical goals for the shift include adequate pain control    Over the shift, the patient did make progress toward the following goals. Reviewed after visit summary and discharge instructions with patient. Patient verbalized understanding of education and feels ready for discharge.

## 2025-06-18 NOTE — DISCHARGE SUMMARY
Discharge Diagnosis  Endometrial hyperplasia without atypia, simple    Issues Requiring Follow-Up  None    Test Results Pending At Discharge  Pending Labs       Order Current Status    Surgical Pathology Exam In process            Hospital Course  Patient admitted on 6/17 for scheduled total laparoscopic hysterectomy, bilateral salpingo-oophorectomy, cystoscopy. Case Uncomplicated, see postoperative note for details. QBL 20 mL. Admitted overnight for observation. By POD#1, patient with adequate pain control on PO medication regimen and making all appropriate milestones including ambulating, voiding spontaneously, and tolerating PO without N/V. Patient was seen by physical therapy and determined to have no needs for discharge. Patient was noted to have a small SUZIE and hyponatremia of 126 on POD1, was treated with a 1000 ml bolus of normal saline and overnight NS infusion. Repeat lab work the following day was normalized.   Discharged home in stable condition with plan for FUV with GYN provider 7/3 for routine postoperative care.        Visit Vitals  /74   Pulse 74   Temp 36.3 °C (97.3 °F) (Temporal)   Resp 17     Vitals:    06/16/25 1105   Weight: 132 kg (291 lb 10.7 oz)       Immunization History   Administered Date(s) Administered    COVID-19, mRNA, LNP-S, PF, 30 mcg/0.3 mL dose 11/26/2021    Flu vaccine (IIV4), preservative free *Check age/dose* 11/01/2016, 10/09/2018, 09/30/2019, 09/06/2020, 10/06/2021    Flu vaccine, quadrivalent, no egg protein, age 6 month or greater (FLUCELVAX) 10/14/2017, 09/08/2019, 10/07/2022, 10/17/2023    Flu vaccine, trivalent, preservative free, age 6 months and greater (Fluarix/Fluzone/Flulaval) 11/05/2015    Moderna COVID-19 vaccine, 12 years and older (50mcg/0.5mL)(Spikevax) 01/20/2024    Moderna SARS-CoV-2 Vaccination 03/25/2021, 04/22/2021    Pfizer COVID-19 vaccine, 12 years and older, (30mcg/0.3mL) (Comirnaty) 08/31/2024    Pfizer COVID-19 vaccine, bivalent, age 12 years  and older (30 mcg/0.3 mL) 09/08/2022    Pneumococcal conjugate vaccine, 13-valent (PREVNAR 13) 07/21/2016       Results        Pertinent Physical Exam At Time of Discharge  See exam from progress note 6/18.    Home Medications     Medication List      START taking these medications     acetaminophen 325 mg tablet; Commonly known as: Tylenol; Take 3 tablets   (975 mg) by mouth every 6 hours if needed for mild pain (1 - 3) for up to   50 doses.   ibuprofen 600 mg tablet; Take 1 tablet (600 mg) by mouth every 6 hours   if needed for moderate pain (4 - 6) for up to 50 doses.   ondansetron 4 mg tablet; Commonly known as: Zofran; Take 1 tablet (4 mg)   by mouth every 6 hours if needed for nausea for up to 20 doses.   polyethylene glycol 17 gram/dose powder; Commonly known as: Glycolax,   Miralax; Mix 17 g of powder (1 capful dissolved in 8 ounces of water) and   drink once daily as needed (for constipation) for up to 10 doses.   traMADol 50 mg tablet; Commonly known as: Ultram; Take 1 tablet (50 mg)   by mouth every 6 hours if needed for severe pain (7 - 10) for up to 12   doses.     CONTINUE taking these medications     * albuterol 2.5 mg /3 mL (0.083 %) nebulizer solution   * albuterol 90 mcg/actuation inhaler   CeleBREX 200 mg capsule; Generic drug: celecoxib   * chlorhexidine 4 % external liquid; Commonly known as: Hibiclens; Use   as directed daily preoperatively for 5 days leading up to surgery, wash   body all over not on face or genital region, let sit on skin for 3 minutes   before rising.   * chlorhexidine 0.12 % solution; Commonly known as: Peridex; Swish and   spit with 15ml of solution the night before and morning of surgery. Do not   swallow.   cholecalciferol 25 mcg (1,000 units) tablet; Commonly known as: Vitamin   D-3   Levsin 0.125 mg tablet; Generic drug: hyoscyamine   lisinopriL-hydrochlorothiazide 20-25 mg tablet   magnesium oxide 500 mg magnesium tablet   Prevacid 24Hr 15 mg DR capsule; Generic drug:  lansoprazole   Singulair 10 mg tablet; Generic drug: montelukast   Symbicort 160-4.5 mcg/actuation inhaler; Generic drug:   budesonide-formoterol  * This list has 4 medication(s) that are the same as other medications   prescribed for you. Read the directions carefully, and ask your doctor or   other care provider to review them with you.       Outpatient Follow-Up  Future Appointments   Date Time Provider Department [place]   7/3/2025  9:00 AM Sarah Rodriguez MD [id number] [facility]   10/15/2025  9:20 AM Aisha Lao MD [id number] [facility]     D/w Dr. Crews.    Augusta Nur MD PGY-2

## 2025-06-25 LAB
LABORATORY COMMENT REPORT: NORMAL
PATH REPORT.FINAL DX SPEC: NORMAL
PATH REPORT.GROSS SPEC: NORMAL
PATH REPORT.RELEVANT HX SPEC: NORMAL
PATH REPORT.TOTAL CANCER: NORMAL

## 2025-07-03 ENCOUNTER — OFFICE VISIT (OUTPATIENT)
Dept: OBSTETRICS AND GYNECOLOGY | Facility: CLINIC | Age: 50
End: 2025-07-03
Payer: COMMERCIAL

## 2025-07-03 VITALS
HEART RATE: 72 BPM | SYSTOLIC BLOOD PRESSURE: 154 MMHG | DIASTOLIC BLOOD PRESSURE: 80 MMHG | HEIGHT: 64 IN | WEIGHT: 288 LBS | BODY MASS INDEX: 49.17 KG/M2

## 2025-07-03 DIAGNOSIS — R30.0 DYSURIA: Primary | ICD-10-CM

## 2025-07-03 DIAGNOSIS — Z48.89 POSTOPERATIVE VISIT: ICD-10-CM

## 2025-07-03 PROCEDURE — 3077F SYST BP >= 140 MM HG: CPT | Performed by: STUDENT IN AN ORGANIZED HEALTH CARE EDUCATION/TRAINING PROGRAM

## 2025-07-03 PROCEDURE — 3008F BODY MASS INDEX DOCD: CPT | Performed by: STUDENT IN AN ORGANIZED HEALTH CARE EDUCATION/TRAINING PROGRAM

## 2025-07-03 PROCEDURE — 99211 OFF/OP EST MAY X REQ PHY/QHP: CPT | Performed by: STUDENT IN AN ORGANIZED HEALTH CARE EDUCATION/TRAINING PROGRAM

## 2025-07-03 PROCEDURE — 3079F DIAST BP 80-89 MM HG: CPT | Performed by: STUDENT IN AN ORGANIZED HEALTH CARE EDUCATION/TRAINING PROGRAM

## 2025-07-03 RX ORDER — PHENAZOPYRIDINE HYDROCHLORIDE 200 MG/1
200 TABLET, FILM COATED ORAL 3 TIMES DAILY PRN
Qty: 30 TABLET | Refills: 0 | Status: SHIPPED | OUTPATIENT
Start: 2025-07-03

## 2025-07-03 RX ORDER — LIDOCAINE HYDROCHLORIDE 20 MG/ML
1 JELLY TOPICAL ONCE
Qty: 50 ML | Refills: 1 | Status: SHIPPED | OUTPATIENT
Start: 2025-07-03 | End: 2025-07-03

## 2025-07-03 ASSESSMENT — ENCOUNTER SYMPTOMS
RESPIRATORY NEGATIVE: 0
HEMATOLOGIC/LYMPHATIC NEGATIVE: 0
EYES NEGATIVE: 0
GASTROINTESTINAL NEGATIVE: 0
CONSTITUTIONAL NEGATIVE: 0
ALLERGIC/IMMUNOLOGIC NEGATIVE: 0
MUSCULOSKELETAL NEGATIVE: 0
ENDOCRINE NEGATIVE: 0
PSYCHIATRIC NEGATIVE: 0
CARDIOVASCULAR NEGATIVE: 0
NEUROLOGICAL NEGATIVE: 0

## 2025-07-03 ASSESSMENT — PAIN SCALES - GENERAL: PAINLEVEL_OUTOF10: 5

## 2025-07-03 NOTE — PROGRESS NOTES
"Division of Minimally Invasive Gynecologic Surgery  [facility]    Date: 7/3/25 - Gynecology Visit    Eulalia Oleary is a 50 y.o. status post TLH-BS on 6/16/25 presents for post op check     Overall recovering well, meeting all milestones. Reports she is on an Macrobid for UTI (based on dysuria and cloudy urine). She has almost completed Macrobid. Cloudiness has resolved, but she does still have some external dysuria. Denies fevers and otherwise doing well.     PMHx, PSHx, SHx, Allergies, and Medications updated in Epic.    ROS: reviewed and negative    PE:/80   Pulse 72   Ht 1.626 m (5' 4\")   Wt 131 kg (288 lb)   LMP 12/12/2024   BMI 49.44 kg/m²    Constitutional:  No acute distress  HEENT: EOM grossly intact, MMM, neck supple and with full ROM  Pulm:  Effort normal. No accessory muscle usage.  No respiratory distress.  Abd: soft, non-distended, non-tender, no palpable masses, incisions c/d/i  Neurological:  AAO x 3, appropriate to interview  Skin: Warm, no pallor.  Psychiatric:  normal mood and affect.    Assessment/Plan:     Eulalia Oleary is a 50 y.o. status post TLH-BS on 6/16/25 presents for post op check   - Recovering well, no concerns  - Path reviewed, benign  - No indication for further pap smears  - Plan to complete antibiotics, wait 1-2 days, and if symptoms persist, will leave outpatient urine sample. Lab ordered. Pyridium, Urojet PRN for dysuria.   - Continue post op restrictions until 6 weeks after surgery, reviewed these today  - RTC at 4-6 weeks post op for cuff check       Sarah Rodriguez MD  Division of Minimally Invasive Gynecologic Surgery  [facility]    "

## 2025-07-30 ENCOUNTER — OFFICE VISIT (OUTPATIENT)
Dept: OBSTETRICS AND GYNECOLOGY | Facility: CLINIC | Age: 50
End: 2025-07-30
Payer: COMMERCIAL

## 2025-07-30 VITALS
WEIGHT: 291 LBS | HEIGHT: 64 IN | SYSTOLIC BLOOD PRESSURE: 161 MMHG | BODY MASS INDEX: 49.68 KG/M2 | DIASTOLIC BLOOD PRESSURE: 77 MMHG | HEART RATE: 75 BPM

## 2025-07-30 DIAGNOSIS — Z09 POSTOP CHECK: Primary | ICD-10-CM

## 2025-07-30 PROCEDURE — 3008F BODY MASS INDEX DOCD: CPT

## 2025-07-30 PROCEDURE — 3077F SYST BP >= 140 MM HG: CPT

## 2025-07-30 PROCEDURE — 3078F DIAST BP <80 MM HG: CPT

## 2025-07-30 PROCEDURE — 1036F TOBACCO NON-USER: CPT

## 2025-07-30 PROCEDURE — 99211 OFF/OP EST MAY X REQ PHY/QHP: CPT

## 2025-07-30 ASSESSMENT — ENCOUNTER SYMPTOMS
RESPIRATORY NEGATIVE: 0
PSYCHIATRIC NEGATIVE: 0
NEUROLOGICAL NEGATIVE: 0
ALLERGIC/IMMUNOLOGIC NEGATIVE: 0
MUSCULOSKELETAL NEGATIVE: 0
EYES NEGATIVE: 0
HEMATOLOGIC/LYMPHATIC NEGATIVE: 0
CARDIOVASCULAR NEGATIVE: 0
CONSTITUTIONAL NEGATIVE: 0
GASTROINTESTINAL NEGATIVE: 0
ENDOCRINE NEGATIVE: 0

## 2025-07-30 ASSESSMENT — PAIN SCALES - GENERAL: PAINLEVEL_OUTOF10: 0-NO PAIN

## 2025-07-30 NOTE — PROGRESS NOTES
Division of Minimally Invasive Gynecologic Surgery  [facility]    Date: 07/30/2025 - Gynecology Visit    Eulalia Oleary is a 50 y.o. status post TLH-BS with [facility] on 06/16/2025.    Overall recovering well, meeting all milestones. Had post op UTI treated with Macrobid,  urinary symptoms have overall resolved. Does have some issues with constipation, back to the baseline with IBS. Denies any vaginal bleeding. No abdominal or pelvic pain.     PMHx, PSHx, SHx, Allergies, and Medications updated in Epic.    ROS: reviewed and negative    PE:  Constitutional:  No acute distress  HEENT: EOM grossly intact, MMM, neck supple and with full ROM  Pulm:  Effort normal. No accessory muscle usage.  No respiratory distress.  Abd: soft, non-distended, non-tender, no palpable masses, incisions c/d/i  :  - EGBUS: grossly WNL  - Speculum: vaginal mucosa grossly WNL, no trauma or lesions, cuff intact w/o laxity   Neurological:  AAO x 3, appropriate to interview  Skin: Warm, no pallor.  Psychiatric:  normal mood and affect.    Assessment/Plan:     Eulalia Oleary is a 50 y.o. status post TLH-BS with [facility] on 06/16/2025.  - Recovering well, no concerns  - Path reviewed at last visit, benign  - No indication for further pap smears, discussed with patient it is recommended to have PCP or routine GYN provider assess the vagina issues every 2-3 years to make sure WNL   - Continue post op restrictions until 6 weeks after surgery, reviewed these today      Kayleigh Montilla APRN-CNP  Division of Minimally Invasive Gynecologic Surgery  [facility]

## 2025-10-15 ENCOUNTER — APPOINTMENT (OUTPATIENT)
Dept: OBSTETRICS AND GYNECOLOGY | Facility: CLINIC | Age: 50
End: 2025-10-15
Payer: COMMERCIAL

## (undated) DEVICE — TROCAR SYSTEM, BALLOON, KII GELPORT, 12 X 100MM

## (undated) DEVICE — TUBING SET, TRI-LUMEN, FILTERED, F/AIRSEAL

## (undated) DEVICE — SYSTEM, FIOS FIRST ENTRY, 5 X 100MM, KII ADVANCED FIXATION

## (undated) DEVICE — SUTURE, V-LOC, 0, 12IN, GS-21, GR 180 ABS

## (undated) DEVICE — OCCLUDER, COLPO-PNEUMO

## (undated) DEVICE — APPLICATOR, ENDOSCOPIC FLOSEAL

## (undated) DEVICE — SUTURE, MONOCRYL, 4-0, 18 IN, PS2, UNDYED

## (undated) DEVICE — HOLSTER, JET SAFETY

## (undated) DEVICE — DRAPE, PAD, PREP, W/ 9 IN CUFF, 24 X 41, LF, NS

## (undated) DEVICE — COVER, TABLE, 44 X 75 IN, DISPOSABLE, LF, STERILE

## (undated) DEVICE — DRESSING, ADHESIVE, ISLAND, TELFA, 2 X 3.75 IN, LF

## (undated) DEVICE — Device

## (undated) DEVICE — COVER, CART, 45 X 27 X 48 IN, CLEAR

## (undated) DEVICE — TUBE, SALEM SUMP, 16 FR X 48IN, ENFIT

## (undated) DEVICE — SEALANT, HEMOSTATIC, FLOSEAL, 10 ML

## (undated) DEVICE — REST, HEAD, BAGEL, 9 IN

## (undated) DEVICE — PREP TRAY, SKIN, DRY, W/GLOVES

## (undated) DEVICE — GOWN, SURGICAL, SMARTGOWN, XLARGE, STERILE

## (undated) DEVICE — LIGASURE, V SEALER/DIVIDER  5MM BLUNT TIP

## (undated) DEVICE — IRRIGATION SET, CYSTOSCOPY, F/CONSTANT/INTERMITTENT, 8 GTT/CC, 77 IN

## (undated) DEVICE — MANIFOLD, 4 PORT NEPTUNE STANDARD

## (undated) DEVICE — TUBING, SUCTION, CONNECTING, STERILE 0.25 X 120 IN., LF

## (undated) DEVICE — SYRINGE, 60 CC, LUER LOCK, MONOJECT, W/O CAP, LF

## (undated) DEVICE — POSITIONING, THE PINK PAD, PIGAZZI SYSTEM

## (undated) DEVICE — RETRACTOR, CERVICAL CUP, VCARE, STANDARD